# Patient Record
Sex: MALE | Race: WHITE | NOT HISPANIC OR LATINO | Employment: UNEMPLOYED | URBAN - METROPOLITAN AREA
[De-identification: names, ages, dates, MRNs, and addresses within clinical notes are randomized per-mention and may not be internally consistent; named-entity substitution may affect disease eponyms.]

---

## 2024-03-24 ENCOUNTER — HOSPITAL ENCOUNTER (EMERGENCY)
Facility: HOSPITAL | Age: 39
Discharge: LEFT BEFORE DISCHARGE | End: 2024-03-24
Payer: MEDICAID

## 2024-03-24 ENCOUNTER — APPOINTMENT (OUTPATIENT)
Dept: GENERAL RADIOLOGY | Facility: HOSPITAL | Age: 39
End: 2024-03-24
Payer: MEDICAID

## 2024-03-24 VITALS
BODY MASS INDEX: 30.48 KG/M2 | RESPIRATION RATE: 18 BRPM | TEMPERATURE: 98.1 F | HEART RATE: 87 BPM | OXYGEN SATURATION: 100 % | DIASTOLIC BLOOD PRESSURE: 80 MMHG | HEIGHT: 67 IN | WEIGHT: 194.22 LBS | SYSTOLIC BLOOD PRESSURE: 118 MMHG

## 2024-03-24 LAB
ALBUMIN SERPL-MCNC: 4 G/DL (ref 3.5–5.2)
ALBUMIN/GLOB SERPL: 1.2 G/DL
ALP SERPL-CCNC: 95 U/L (ref 39–117)
ALT SERPL W P-5'-P-CCNC: 11 U/L (ref 1–41)
ANION GAP SERPL CALCULATED.3IONS-SCNC: 9.8 MMOL/L (ref 5–15)
AST SERPL-CCNC: 17 U/L (ref 1–40)
BASOPHILS # BLD AUTO: 0.04 10*3/MM3 (ref 0–0.2)
BASOPHILS NFR BLD AUTO: 0.4 % (ref 0–1.5)
BILIRUB SERPL-MCNC: <0.2 MG/DL (ref 0–1.2)
BUN SERPL-MCNC: 17 MG/DL (ref 6–20)
BUN/CREAT SERPL: 21.3 (ref 7–25)
CALCIUM SPEC-SCNC: 9 MG/DL (ref 8.6–10.5)
CHLORIDE SERPL-SCNC: 100 MMOL/L (ref 98–107)
CO2 SERPL-SCNC: 29.2 MMOL/L (ref 22–29)
CREAT SERPL-MCNC: 0.8 MG/DL (ref 0.76–1.27)
DEPRECATED RDW RBC AUTO: 44.5 FL (ref 37–54)
EGFRCR SERPLBLD CKD-EPI 2021: 116.2 ML/MIN/1.73
EOSINOPHIL # BLD AUTO: 0.19 10*3/MM3 (ref 0–0.4)
EOSINOPHIL NFR BLD AUTO: 2 % (ref 0.3–6.2)
ERYTHROCYTE [DISTWIDTH] IN BLOOD BY AUTOMATED COUNT: 13.6 % (ref 12.3–15.4)
GLOBULIN UR ELPH-MCNC: 3.4 GM/DL
GLUCOSE SERPL-MCNC: 81 MG/DL (ref 65–99)
HCT VFR BLD AUTO: 45.2 % (ref 37.5–51)
HGB BLD-MCNC: 15.1 G/DL (ref 13–17.7)
IMM GRANULOCYTES # BLD AUTO: 0.02 10*3/MM3 (ref 0–0.05)
IMM GRANULOCYTES NFR BLD AUTO: 0.2 % (ref 0–0.5)
LYMPHOCYTES # BLD AUTO: 2.24 10*3/MM3 (ref 0.7–3.1)
LYMPHOCYTES NFR BLD AUTO: 24.1 % (ref 19.6–45.3)
MCH RBC QN AUTO: 29.7 PG (ref 26.6–33)
MCHC RBC AUTO-ENTMCNC: 33.4 G/DL (ref 31.5–35.7)
MCV RBC AUTO: 88.8 FL (ref 79–97)
MONOCYTES # BLD AUTO: 0.84 10*3/MM3 (ref 0.1–0.9)
MONOCYTES NFR BLD AUTO: 9 % (ref 5–12)
NEUTROPHILS NFR BLD AUTO: 5.96 10*3/MM3 (ref 1.7–7)
NEUTROPHILS NFR BLD AUTO: 64.3 % (ref 42.7–76)
NRBC BLD AUTO-RTO: 0 /100 WBC (ref 0–0.2)
PLATELET # BLD AUTO: 226 10*3/MM3 (ref 140–450)
PMV BLD AUTO: 9.8 FL (ref 6–12)
POTASSIUM SERPL-SCNC: 4.4 MMOL/L (ref 3.5–5.2)
PROT SERPL-MCNC: 7.4 G/DL (ref 6–8.5)
RBC # BLD AUTO: 5.09 10*6/MM3 (ref 4.14–5.8)
SODIUM SERPL-SCNC: 139 MMOL/L (ref 136–145)
WBC NRBC COR # BLD AUTO: 9.29 10*3/MM3 (ref 3.4–10.8)

## 2024-03-24 PROCEDURE — 85025 COMPLETE CBC W/AUTO DIFF WBC: CPT

## 2024-03-24 PROCEDURE — 80053 COMPREHEN METABOLIC PANEL: CPT

## 2024-03-24 PROCEDURE — 99211 OFF/OP EST MAY X REQ PHY/QHP: CPT

## 2024-03-24 PROCEDURE — 74018 RADEX ABDOMEN 1 VIEW: CPT

## 2024-03-24 NOTE — ED TRIAGE NOTES
"Pt comes to the ER tonight for no BM for 9 days. Pt states that he has taken multiple laxatives and it still hasn't had one. Pt states that he vomited tonight and states \"it looked like poop.\" Pt is a pt at the committment [place].   "

## 2024-03-24 NOTE — ED NOTES
Another RN reported patient stopped her in [facility] and stated he had a large BM, that he had be given lots of medicine at [facility] and now having results.

## 2024-04-02 ENCOUNTER — HOSPITAL ENCOUNTER (EMERGENCY)
Facility: HOSPITAL | Age: 39
Discharge: HOME | End: 2024-04-02
Attending: EMERGENCY MEDICINE
Payer: MEDICAID

## 2024-04-02 VITALS
HEIGHT: 67 IN | SYSTOLIC BLOOD PRESSURE: 124 MMHG | OXYGEN SATURATION: 92 % | DIASTOLIC BLOOD PRESSURE: 73 MMHG | HEART RATE: 84 BPM | WEIGHT: 188.49 LBS | BODY MASS INDEX: 29.58 KG/M2 | RESPIRATION RATE: 22 BRPM | TEMPERATURE: 98 F

## 2024-04-02 DIAGNOSIS — T78.40XA ALLERGIC REACTION, INITIAL ENCOUNTER: Primary | ICD-10-CM

## 2024-04-02 LAB
FLUAV SUBTYP SPEC NAA+PROBE: NOT DETECTED
FLUBV RNA ISLT QL NAA+PROBE: NOT DETECTED
HOLD SPECIMEN: NORMAL
HOLD SPECIMEN: NORMAL
RSV RNA NPH QL NAA+NON-PROBE: NOT DETECTED
S PYO AG THROAT QL: NEGATIVE
SARS-COV-2 RNA RESP QL NAA+PROBE: NOT DETECTED
WHOLE BLOOD HOLD COAG: NORMAL
WHOLE BLOOD HOLD SPECIMEN: NORMAL

## 2024-04-02 PROCEDURE — 87880 STREP A ASSAY W/OPTIC: CPT | Performed by: EMERGENCY MEDICINE

## 2024-04-02 PROCEDURE — 99283 EMERGENCY DEPT VISIT LOW MDM: CPT

## 2024-04-02 PROCEDURE — 96375 TX/PRO/DX INJ NEW DRUG ADDON: CPT

## 2024-04-02 PROCEDURE — 25010000002 DIPHENHYDRAMINE PER 50 MG: Performed by: EMERGENCY MEDICINE

## 2024-04-02 PROCEDURE — 87637 SARSCOV2&INF A&B&RSV AMP PRB: CPT | Performed by: EMERGENCY MEDICINE

## 2024-04-02 PROCEDURE — 87081 CULTURE SCREEN ONLY: CPT | Performed by: EMERGENCY MEDICINE

## 2024-04-02 PROCEDURE — 25010000002 METHYLPREDNISOLONE PER 125 MG: Performed by: EMERGENCY MEDICINE

## 2024-04-02 PROCEDURE — 96374 THER/PROPH/DIAG INJ IV PUSH: CPT

## 2024-04-02 RX ORDER — CITALOPRAM HYDROBROMIDE 10 MG/1
10 TABLET ORAL DAILY
COMMUNITY

## 2024-04-02 RX ORDER — QUETIAPINE FUMARATE 25 MG/1
25 TABLET, FILM COATED ORAL NIGHTLY
COMMUNITY

## 2024-04-02 RX ORDER — METHYLPREDNISOLONE SODIUM SUCCINATE 125 MG/2ML
125 INJECTION, POWDER, LYOPHILIZED, FOR SOLUTION INTRAMUSCULAR; INTRAVENOUS ONCE
Status: COMPLETED | OUTPATIENT
Start: 2024-04-02 | End: 2024-04-02

## 2024-04-02 RX ORDER — BUPRENORPHINE HYDROCHLORIDE AND NALOXONE HYDROCHLORIDE DIHYDRATE 8; 2 MG/1; MG/1
1 TABLET SUBLINGUAL DAILY
COMMUNITY

## 2024-04-02 RX ORDER — FAMOTIDINE 10 MG/ML
20 INJECTION, SOLUTION INTRAVENOUS ONCE
Status: COMPLETED | OUTPATIENT
Start: 2024-04-02 | End: 2024-04-02

## 2024-04-02 RX ORDER — PREDNISONE 20 MG/1
60 TABLET ORAL DAILY
Qty: 15 TABLET | Refills: 0 | Status: SHIPPED | OUTPATIENT
Start: 2024-04-02

## 2024-04-02 RX ORDER — DIPHENHYDRAMINE HYDROCHLORIDE 50 MG/ML
25 INJECTION INTRAMUSCULAR; INTRAVENOUS ONCE
Status: COMPLETED | OUTPATIENT
Start: 2024-04-02 | End: 2024-04-02

## 2024-04-02 RX ADMIN — FAMOTIDINE 20 MG: 10 INJECTION INTRAVENOUS at 16:59

## 2024-04-02 RX ADMIN — METHYLPREDNISOLONE SODIUM SUCCINATE 125 MG: 125 INJECTION INTRAMUSCULAR; INTRAVENOUS at 16:58

## 2024-04-02 RX ADMIN — DIPHENHYDRAMINE HYDROCHLORIDE 25 MG: 50 INJECTION, SOLUTION INTRAMUSCULAR; INTRAVENOUS at 16:59

## 2024-04-02 NOTE — ED NOTES
Patient is A/O x4. States his voice is raspier than normal. Oral cavity is WNL with some redness. Patient is coloring.

## 2024-04-02 NOTE — ED PROVIDER NOTES
"Time: 4:42 PM EDT  Date of encounter:  4/2/2024  Independent Historian/Clinical History and Information was obtained by:   Patient    History is limited by: N/A    Chief Complaint: Allergic reaction      History of Present Illness:  Patient is a 39 y.o. year old male who presents to the emergency department for evaluation of allergic reaction.  He denies changes of medications.  Patient states that he possibly could have had coconut.  Is currently [facility] and states that they had an Easter brunch.  Started having shortness of breath and swelling of his throat.     HPI    Patient Care Team  Primary Care Provider: Provider, No Known    Past Medical History:     No Known Allergies  Past Medical History:   Diagnosis Date    History of appendectomy      History reviewed. No pertinent surgical history.  History reviewed. No pertinent family history.    Home Medications:  Prior to Admission medications    Not on File        Social History:          Review of Systems:  Review of Systems   Constitutional:  Negative for chills and fever.   HENT:  Positive for trouble swallowing and voice change. Negative for congestion, rhinorrhea and sore throat.    Eyes:  Negative for pain and visual disturbance.   Respiratory:  Positive for shortness of breath. Negative for apnea, cough and chest tightness.    Cardiovascular:  Negative for chest pain and palpitations.   Gastrointestinal:  Negative for abdominal pain, diarrhea, nausea and vomiting.   Genitourinary:  Negative for difficulty urinating and dysuria.   Musculoskeletal:  Negative for joint swelling and myalgias.   Skin:  Negative for color change.   Neurological:  Negative for seizures and headaches.   Psychiatric/Behavioral: Negative.     All other systems reviewed and are negative.       Physical Exam:  /73   Pulse 84   Temp 98 °F (36.7 °C) (Oral)   Resp 22   Ht 170.2 cm (67\")   Wt 85.5 kg (188 lb 7.9 oz)   SpO2 92%   BMI 29.52 kg/m²     Physical " Exam  Vitals and nursing note reviewed.   Constitutional:       General: He is not in acute distress.     Appearance: Normal appearance. He is not toxic-appearing.   HENT:      Head: Normocephalic and atraumatic.      Jaw: There is normal jaw occlusion.   Eyes:      General: Lids are normal.      Extraocular Movements: Extraocular movements intact.      Conjunctiva/sclera: Conjunctivae normal.      Pupils: Pupils are equal, round, and reactive to light.   Cardiovascular:      Rate and Rhythm: Normal rate and regular rhythm.      Pulses: Normal pulses.      Heart sounds: Normal heart sounds.   Pulmonary:      Effort: Pulmonary effort is normal. No respiratory distress.      Breath sounds: Normal breath sounds. No stridor. No wheezing or rhonchi.   Abdominal:      General: Abdomen is flat. There is no distension.      Palpations: Abdomen is soft.      Tenderness: There is no abdominal tenderness. There is no guarding or rebound.   Musculoskeletal:         General: Normal range of motion.      Cervical back: Normal range of motion and neck supple.      Right lower leg: No edema.      Left lower leg: No edema.   Skin:     General: Skin is warm and dry.      Coloration: Skin is not cyanotic.   Neurological:      Mental Status: He is alert and oriented to person, place, and time. Mental status is at baseline.   Psychiatric:         Attention and Perception: Attention and perception normal.         Mood and Affect: Mood normal.                Procedures:  Procedures      Medical Decision Making:      Comorbidities that affect care:    Substance Abuse    External Notes reviewed:    None      The following orders were placed and all results were independently analyzed by me:  Orders Placed This Encounter   Procedures    COVID-19, FLU A/B, RSV PCR 1 HR TAT - Swab, Nasopharynx    Rapid Strep A Screen - Swab, Throat    Beta Strep Culture, Throat - Swab, Throat    [place] Draw    Green Top (Gel)    Lavender Top    Gold Top - SST     Light Blue Top       Medications Given in the Emergency Department:  Medications   famotidine (PEPCID) injection 20 mg (20 mg Intravenous Given 4/2/24 1659)   diphenhydrAMINE (BENADRYL) injection 25 mg (25 mg Intravenous Given 4/2/24 1659)   methylPREDNISolone sodium succinate (SOLU-Medrol) injection 125 mg (125 mg Intravenous Given 4/2/24 1658)        ED Course:    ED Course as of 04/02/24 1820 Tue Apr 02, 2024 1645 --- PROVIDER IN TRIAGE NOTE ---    The patient was evaluated by Melinda baker in triage. Orders were placed and the patient is currently awaiting disposition.    [AJ]      ED Course User Index  [AJ] Melinda Lynn PA-C       Labs:    Lab Results (last 24 hours)       Procedure Component Value Units Date/Time    COVID-19, FLU A/B, RSV PCR 1 HR TAT - Swab, Nasopharynx [[id number]]  (Normal) Collected: 04/02/24 1652    Specimen: Swab from Nasopharynx Updated: 04/02/24 1742     COVID19 Not Detected     Influenza A PCR Not Detected     Influenza B PCR Not Detected     RSV, PCR Not Detected    Narrative:      Fact sheet for providers: https://www.fda.gov/media/916333/download    Fact sheet for patients: https://www.fda.gov/media/618575/download    Test performed by PCR.    Rapid Strep A Screen - Swab, Throat [[id number]]  (Normal) Collected: 04/02/24 1652    Specimen: Swab from Throat Updated: 04/02/24 1720     Strep A Ag Negative    Beta Strep Culture, Throat - Swab, Throat [[id number]] Collected: 04/02/24 1652    Specimen: Swab from Throat Updated: 04/02/24 1720             Imaging:    No Radiology Exams Resulted Within Past 24 Hours      Differential Diagnosis and Discussion:    Allergic Reaction: Differential diagnosis includes but is not limited to drug side effects, contact dermatitis, autoimmune conditions, infections, mast cell disorders, serum sickness, anaphylactoid reactions, angioedema, panic or anxiety attacks.    All labs were reviewed and interpreted by me.    MDM  Number of  Diagnoses or Management Options  Allergic reaction, initial encounter  Diagnosis management comments: In summary this is a 39-year-old male patient presents to the emerged department for evaluation of allergic reaction.  Does state he has a known allergy to coconut and ate several pies here today and believes he has had allergic reaction to it.  He states this is how he feels over time his allergic reaction.  He complains of a hoarse and sore throat.  With voice changes.  COVID-19, influenza, RSV, strep testing all negative.  Patient received IV steroids, IV Benadryl, Pepcid the emergency department will be discharged home with steroids.  Otherwise well-appearing no acute distress, no respiratory stress specifically.  Very strict return to ER and follow-up instructions have been provided to the patient.                   Patient Care Considerations:    EPINEPHRINE: I considered giving epinephrine, however the patient has no respiratory distress, stridor and improved with treatment.      Consultants/Shared Management Plan:    None    Social Determinants of Health:    Patient is independent, reliable, and has access to care.       Disposition and Care Coordination:    Discharged: The patient is suitable and stable for discharge with no need for consideration of admission.    I have explained the patient´s condition, diagnoses and treatment plan based on the information available to me at this time. I have answered questions and addressed any concerns. The patient has a good  understanding of the patient´s diagnosis, condition, and treatment plan as can be expected at this point. The vital signs have been stable. The patient´s condition is stable and appropriate for discharge from the emergency department.      The patient will pursue further outpatient evaluation with the primary care physician or other designated or consulting physician as outlined in the discharge instructions. They are agreeable to this plan of  care and follow-up instructions have been explained in detail. The patient has received these instructions in written format and has expressed an understanding of the discharge instructions. The patient is aware that any significant change in condition or worsening of symptoms should prompt an immediate return to this or the closest emergency department or call to 911.  I have explained discharge medications and the need for follow up with the patient/caretakers. This was also printed in the discharge instructions. Patient was discharged with the following medications and follow up:      Medication List        New Prescriptions      predniSONE 20 MG tablet  Commonly known as: DELTASONE  Take 3 tablets by mouth Daily.               Where to Get Your Medications        These medications [place] [facility] [place] [address] - [phone] [address] [phone] FX  [address], [place] [address]      Phone: [phone]   predniSONE 20 MG tablet      Provider, No Known  [facility] [id number]    In 1 week         Final diagnoses:   Allergic reaction, initial encounter        ED Disposition       ED Disposition   Discharge    Condition   Stable    Comment   --               This medical record created using voice recognition software.             Casper Carson MD  04/02/24 1614     Patient Representative

## 2024-04-04 LAB — BACTERIA SPEC AEROBE CULT: NORMAL

## 2024-04-05 ENCOUNTER — TELEPHONE (OUTPATIENT)
Dept: ORTHOPEDIC SURGERY | Facility: CLINIC | Age: 39
End: 2024-04-05
Payer: MEDICAID

## 2024-04-05 NOTE — TELEPHONE ENCOUNTER
BASIA, VERIFIED NEW PT URGENCY: ROUTINE REFERRAL FROM MICHAELA CANTU TO ORTHO SURGERY FOR Right ankle pain & History of open reduction and internal fixation (ORIF) procedure. RECEIVED AND INDEXED RECORDS: REF 4.1.24, CONSENT 3.19.24,     FRANCK WITH [facility] IN [place] CALLED AND STATED THAT THE PT DOES NOT KNOW WHO HE SAW OR WHO IT WAS WITH - PT IS FROM [place] - ALL HE KNOWS IT WAS SEVERAL YEARS AGO AND CAN'T RECALL WHEN OR WHERE IT WAS DONE., REFERRING PROVIDER JUST WANTS AN EVAL - STATES THAT ITS REALLY SWOLLEN,

## 2024-06-05 ENCOUNTER — HOSPITAL ENCOUNTER (EMERGENCY)
Facility: HOSPITAL | Age: 39
Discharge: HOME | End: 2024-06-05
Attending: EMERGENCY MEDICINE | Admitting: EMERGENCY MEDICINE
Payer: MEDICAID

## 2024-06-05 ENCOUNTER — APPOINTMENT (OUTPATIENT)
Facility: HOSPITAL | Age: 39
End: 2024-06-05
Payer: MEDICAID

## 2024-06-05 VITALS
HEART RATE: 70 BPM | DIASTOLIC BLOOD PRESSURE: 62 MMHG | RESPIRATION RATE: 18 BRPM | TEMPERATURE: 98 F | SYSTOLIC BLOOD PRESSURE: 111 MMHG | HEIGHT: 67 IN | WEIGHT: 240 LBS | BODY MASS INDEX: 37.67 KG/M2 | OXYGEN SATURATION: 95 %

## 2024-06-05 DIAGNOSIS — R60.0 PEDAL EDEMA: Primary | ICD-10-CM

## 2024-06-05 LAB
ALBUMIN SERPL-MCNC: 3.8 G/DL (ref 3.5–5.2)
ALBUMIN/GLOB SERPL: 1.2 G/DL
ALP SERPL-CCNC: 76 U/L (ref 39–117)
ALT SERPL W P-5'-P-CCNC: <5 U/L (ref 1–41)
ANION GAP SERPL CALCULATED.3IONS-SCNC: 8.7 MMOL/L (ref 5–15)
AST SERPL-CCNC: 19 U/L (ref 1–40)
BASOPHILS # BLD AUTO: 0.01 10*3/MM3 (ref 0–0.2)
BASOPHILS NFR BLD AUTO: 0.2 % (ref 0–1.5)
BILIRUB SERPL-MCNC: 0.3 MG/DL (ref 0–1.2)
BUN SERPL-MCNC: 15 MG/DL (ref 6–20)
BUN/CREAT SERPL: 23.4 (ref 7–25)
CALCIUM SPEC-SCNC: 8.9 MG/DL (ref 8.6–10.5)
CHLORIDE SERPL-SCNC: 103 MMOL/L (ref 98–107)
CO2 SERPL-SCNC: 25.3 MMOL/L (ref 22–29)
CREAT SERPL-MCNC: 0.64 MG/DL (ref 0.76–1.27)
DEPRECATED RDW RBC AUTO: 41.5 FL (ref 37–54)
EGFRCR SERPLBLD CKD-EPI 2021: 124.3 ML/MIN/1.73
EOSINOPHIL # BLD AUTO: 0.25 10*3/MM3 (ref 0–0.4)
EOSINOPHIL NFR BLD AUTO: 4.2 % (ref 0.3–6.2)
ERYTHROCYTE [DISTWIDTH] IN BLOOD BY AUTOMATED COUNT: 12.8 % (ref 12.3–15.4)
GLOBULIN UR ELPH-MCNC: 3.1 GM/DL
GLUCOSE SERPL-MCNC: 115 MG/DL (ref 65–99)
HCT VFR BLD AUTO: 36.9 % (ref 37.5–51)
HGB BLD-MCNC: 12.7 G/DL (ref 13–17.7)
IMM GRANULOCYTES # BLD AUTO: 0.02 10*3/MM3 (ref 0–0.05)
IMM GRANULOCYTES NFR BLD AUTO: 0.3 % (ref 0–0.5)
LYMPHOCYTES # BLD AUTO: 1.93 10*3/MM3 (ref 0.7–3.1)
LYMPHOCYTES NFR BLD AUTO: 32.6 % (ref 19.6–45.3)
MCH RBC QN AUTO: 29.9 PG (ref 26.6–33)
MCHC RBC AUTO-ENTMCNC: 34.4 G/DL (ref 31.5–35.7)
MCV RBC AUTO: 86.8 FL (ref 79–97)
MONOCYTES # BLD AUTO: 0.65 10*3/MM3 (ref 0.1–0.9)
MONOCYTES NFR BLD AUTO: 11 % (ref 5–12)
NEUTROPHILS NFR BLD AUTO: 3.06 10*3/MM3 (ref 1.7–7)
NEUTROPHILS NFR BLD AUTO: 51.7 % (ref 42.7–76)
NT-PROBNP SERPL-MCNC: 43.6 PG/ML (ref 0–450)
PLATELET # BLD AUTO: 272 10*3/MM3 (ref 140–450)
PMV BLD AUTO: 9.3 FL (ref 6–12)
POTASSIUM SERPL-SCNC: 3.8 MMOL/L (ref 3.5–5.2)
PROT SERPL-MCNC: 6.9 G/DL (ref 6–8.5)
RBC # BLD AUTO: 4.25 10*6/MM3 (ref 4.14–5.8)
SODIUM SERPL-SCNC: 137 MMOL/L (ref 136–145)
TROPONIN T SERPL HS-MCNC: <6 NG/L
WBC NRBC COR # BLD AUTO: 5.92 10*3/MM3 (ref 3.4–10.8)

## 2024-06-05 PROCEDURE — 80053 COMPREHEN METABOLIC PANEL: CPT | Performed by: EMERGENCY MEDICINE

## 2024-06-05 PROCEDURE — 93005 ELECTROCARDIOGRAM TRACING: CPT | Performed by: EMERGENCY MEDICINE

## 2024-06-05 PROCEDURE — 99284 EMERGENCY DEPT VISIT MOD MDM: CPT

## 2024-06-05 PROCEDURE — 84484 ASSAY OF TROPONIN QUANT: CPT | Performed by: EMERGENCY MEDICINE

## 2024-06-05 PROCEDURE — 83880 ASSAY OF NATRIURETIC PEPTIDE: CPT | Performed by: EMERGENCY MEDICINE

## 2024-06-05 PROCEDURE — 85025 COMPLETE CBC W/AUTO DIFF WBC: CPT | Performed by: EMERGENCY MEDICINE

## 2024-06-05 PROCEDURE — 71045 X-RAY EXAM CHEST 1 VIEW: CPT

## 2024-06-05 PROCEDURE — 96374 THER/PROPH/DIAG INJ IV PUSH: CPT

## 2024-06-05 PROCEDURE — 25010000002 FUROSEMIDE PER 20 MG: Performed by: EMERGENCY MEDICINE

## 2024-06-05 RX ORDER — FUROSEMIDE 10 MG/ML
20 INJECTION INTRAMUSCULAR; INTRAVENOUS ONCE
Status: COMPLETED | OUTPATIENT
Start: 2024-06-05 | End: 2024-06-05

## 2024-06-05 RX ORDER — SODIUM CHLORIDE 0.9 % (FLUSH) 0.9 %
10 SYRINGE (ML) INJECTION AS NEEDED
Status: DISCONTINUED | OUTPATIENT
Start: 2024-06-05 | End: 2024-06-05 | Stop reason: HOSPADM

## 2024-06-05 RX ORDER — FUROSEMIDE 20 MG/1
20 TABLET ORAL DAILY
Qty: 3 TABLET | Refills: 0 | Status: SHIPPED | OUTPATIENT
Start: 2024-06-05 | End: 2024-06-05

## 2024-06-05 RX ORDER — FUROSEMIDE 20 MG/1
20 TABLET ORAL DAILY
Qty: 3 TABLET | Refills: 0 | Status: SHIPPED | OUTPATIENT
Start: 2024-06-05 | End: 2024-06-08

## 2024-06-05 RX ADMIN — FUROSEMIDE 20 MG: 10 INJECTION, SOLUTION INTRAMUSCULAR; INTRAVENOUS at 18:49

## 2024-06-05 NOTE — FSED PROVIDER NOTE
Subjective   History of Present Illness  Patient presents to the emergency department for bilateral lower extremity edema.  This has been going on for the past several months.  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  Says he has had heart attacks in the past but has no known history of congestive heart failure.  He denies shortness of breath.  Says he occasionally has chest pain but does not actually have any at this time.  No recent illnesses nausea or vomiting.  Says his feet and legs do burn.    History provided by:  Patient   used: No        Review of Systems   Cardiovascular:  Positive for leg swelling.   All other systems reviewed and are negative.      Past Medical History:   Diagnosis Date    History of appendectomy        Allergies   Allergen Reactions    Coconut Anaphylaxis       No past surgical history on file.    No family history on file.    Social History     Socioeconomic History    Marital status:            Objective   Physical Exam  Constitutional:       Appearance: Normal appearance.   HENT:      Head: Normocephalic and atraumatic.      Nose: Nose normal.      Mouth/Throat:      Mouth: Mucous membranes are moist.      Pharynx: Oropharynx is clear.   Eyes:      Extraocular Movements: Extraocular movements intact.      Pupils: Pupils are equal, round, and reactive to light.   Cardiovascular:      Rate and Rhythm: Normal rate and regular rhythm.      Pulses: Normal pulses.           Dorsalis pedis pulses are 2+ on the right side and 2+ on the left side.      Heart sounds: Normal heart sounds. No murmur heard.  Musculoskeletal:      Cervical back: Normal range of motion and neck supple.      Right lower le+ Pitting Edema present.      Left lower le+ Pitting Edema present.   Skin:     General: Skin is warm and dry.      Capillary Refill: Capillary refill takes less than 2 seconds.      Coloration: Skin is not jaundiced.   Neurological:      General: No focal  deficit present.      Mental Status: He is alert and oriented to person, place, and time.      Cranial Nerves: No cranial nerve deficit.   Psychiatric:         Mood and Affect: Mood normal.         Behavior: Behavior normal.         ECG 12 Lead      Date/Time: 6/5/2024 6:32 PM    Performed by: Lul Shaw MD  Authorized by: Lul Shaw MD  Interpreted by ED physician  Comparison: not compared with previous ECG   Rhythm: sinus rhythm  Rate: normal  BPM: 65  QRS axis: normal  Conduction: conduction normal  ST Segments: ST segments normal  Clinical impression: normal ECG               ED Course                                           Medical Decision Making  Hemodynamically stable and afebrile.  Patient has 2+ pedal edema in his lower extremities but is having no respiratory compromise.  Chest x-ray shows no edema and his EKG is normal.  Troponins and BNP are likewise negative.  Patient will need follow-up.  Will give a short course of Lasix to try to help alleviate his symptoms.  Patient voices understanding and agreement with this plan.  Discharge    Problems Addressed:  Pedal edema: complicated acute illness or injury    Amount and/or Complexity of Data Reviewed  Labs: ordered. Decision-making details documented in ED Course.  Radiology: ordered. Decision-making details documented in ED Course.  ECG/medicine tests: ordered. Decision-making details documented in ED Course.    Risk  Prescription drug management.        Final diagnoses:   Pedal edema       ED Disposition  ED Disposition       ED Disposition   Discharge    Condition   Stable    Comment   --               [facility] EMERGENCY DEPARTMENT [place]  [address]  [facility] [id number] [phone]  Go to   As needed    [facility]IENT [facility] - [facility] [id number]  [phone]  Schedule an appointment as soon as possible for a visit   As needed         Medication List        New Prescriptions       furosemide 20 MG tablet  Commonly known as: LASIX  Take 1 tablet by mouth Daily for 3 days.               Where to Get Your Medications        You can get these medications from any pharmacy    Bring a paper prescription for each of these medications  furosemide 20 MG tablet

## 2024-06-07 LAB
QT INTERVAL: 434 MS
QTC INTERVAL: 451 MS

## 2024-08-14 ENCOUNTER — APPOINTMENT (OUTPATIENT)
Dept: ULTRASOUND IMAGING | Facility: HOSPITAL | Age: 39
End: 2024-08-14
Payer: MEDICAID

## 2024-08-14 ENCOUNTER — APPOINTMENT (OUTPATIENT)
Dept: GENERAL RADIOLOGY | Facility: HOSPITAL | Age: 39
End: 2024-08-14
Payer: MEDICAID

## 2024-08-14 ENCOUNTER — APPOINTMENT (OUTPATIENT)
Dept: CT IMAGING | Facility: HOSPITAL | Age: 39
End: 2024-08-14
Payer: MEDICAID

## 2024-08-14 ENCOUNTER — HOSPITAL ENCOUNTER (EMERGENCY)
Facility: HOSPITAL | Age: 39
Discharge: HOME | End: 2024-08-14
Attending: STUDENT IN AN ORGANIZED HEALTH CARE EDUCATION/TRAINING PROGRAM
Payer: MEDICAID

## 2024-08-14 VITALS
OXYGEN SATURATION: 98 % | TEMPERATURE: 97.9 F | HEART RATE: 80 BPM | WEIGHT: 245 LBS | SYSTOLIC BLOOD PRESSURE: 107 MMHG | HEIGHT: 67 IN | RESPIRATION RATE: 18 BRPM | BODY MASS INDEX: 38.45 KG/M2 | DIASTOLIC BLOOD PRESSURE: 63 MMHG

## 2024-08-14 DIAGNOSIS — R60.0 LOWER EXTREMITY EDEMA: Primary | ICD-10-CM

## 2024-08-14 DIAGNOSIS — F19.20 POLYSUBSTANCE (EXCLUDING OPIOIDS) DEPENDENCE: ICD-10-CM

## 2024-08-14 LAB
ALBUMIN SERPL-MCNC: 3.6 G/DL (ref 3.5–5.2)
ALBUMIN/GLOB SERPL: 1.2 G/DL
ALP SERPL-CCNC: 94 U/L (ref 39–117)
ALT SERPL W P-5'-P-CCNC: 17 U/L (ref 1–41)
AMPHET+METHAMPHET UR QL: NEGATIVE
AMPHETAMINES UR QL: NEGATIVE
ANION GAP SERPL CALCULATED.3IONS-SCNC: 11 MMOL/L (ref 5–15)
AST SERPL-CCNC: 21 U/L (ref 1–40)
BARBITURATES UR QL SCN: NEGATIVE
BASOPHILS # BLD AUTO: 0.03 10*3/MM3 (ref 0–0.2)
BASOPHILS NFR BLD AUTO: 0.5 % (ref 0–1.5)
BENZODIAZ UR QL SCN: NEGATIVE
BILIRUB SERPL-MCNC: <0.2 MG/DL (ref 0–1.2)
BUN SERPL-MCNC: 10 MG/DL (ref 6–20)
BUN/CREAT SERPL: 15.6 (ref 7–25)
BUPRENORPHINE SERPL-MCNC: POSITIVE NG/ML
CALCIUM SPEC-SCNC: 9.1 MG/DL (ref 8.6–10.5)
CANNABINOIDS SERPL QL: NEGATIVE
CHLORIDE SERPL-SCNC: 104 MMOL/L (ref 98–107)
CO2 SERPL-SCNC: 25 MMOL/L (ref 22–29)
COCAINE UR QL: NEGATIVE
CREAT SERPL-MCNC: 0.64 MG/DL (ref 0.76–1.27)
D DIMER PPP FEU-MCNC: 1 MCGFEU/ML (ref 0–0.5)
DEPRECATED RDW RBC AUTO: 41.6 FL (ref 37–54)
EGFRCR SERPLBLD CKD-EPI 2021: 124.3 ML/MIN/1.73
EOSINOPHIL # BLD AUTO: 0.18 10*3/MM3 (ref 0–0.4)
EOSINOPHIL NFR BLD AUTO: 2.7 % (ref 0.3–6.2)
ERYTHROCYTE [DISTWIDTH] IN BLOOD BY AUTOMATED COUNT: 12.7 % (ref 12.3–15.4)
ETHANOL BLD-MCNC: <10 MG/DL (ref 0–10)
ETHANOL UR QL: <0.01 %
FENTANYL UR-MCNC: NEGATIVE NG/ML
GEN 5 2HR TROPONIN T REFLEX: <6 NG/L
GLOBULIN UR ELPH-MCNC: 2.9 GM/DL
GLUCOSE SERPL-MCNC: 88 MG/DL (ref 65–99)
HCT VFR BLD AUTO: 36.9 % (ref 37.5–51)
HGB BLD-MCNC: 12.3 G/DL (ref 13–17.7)
HOLD SPECIMEN: NORMAL
HOLD SPECIMEN: NORMAL
IMM GRANULOCYTES # BLD AUTO: 0.05 10*3/MM3 (ref 0–0.05)
IMM GRANULOCYTES NFR BLD AUTO: 0.8 % (ref 0–0.5)
LYMPHOCYTES # BLD AUTO: 1.75 10*3/MM3 (ref 0.7–3.1)
LYMPHOCYTES NFR BLD AUTO: 26.7 % (ref 19.6–45.3)
MCH RBC QN AUTO: 29.9 PG (ref 26.6–33)
MCHC RBC AUTO-ENTMCNC: 33.3 G/DL (ref 31.5–35.7)
MCV RBC AUTO: 89.6 FL (ref 79–97)
METHADONE UR QL SCN: NEGATIVE
MONOCYTES # BLD AUTO: 0.65 10*3/MM3 (ref 0.1–0.9)
MONOCYTES NFR BLD AUTO: 9.9 % (ref 5–12)
NEUTROPHILS NFR BLD AUTO: 3.9 10*3/MM3 (ref 1.7–7)
NEUTROPHILS NFR BLD AUTO: 59.4 % (ref 42.7–76)
NRBC BLD AUTO-RTO: 0 /100 WBC (ref 0–0.2)
NT-PROBNP SERPL-MCNC: 215.3 PG/ML (ref 0–450)
OPIATES UR QL: NEGATIVE
OXYCODONE UR QL SCN: NEGATIVE
PCP UR QL SCN: NEGATIVE
PLATELET # BLD AUTO: 251 10*3/MM3 (ref 140–450)
PMV BLD AUTO: 9.7 FL (ref 6–12)
POTASSIUM SERPL-SCNC: 4.2 MMOL/L (ref 3.5–5.2)
PROT SERPL-MCNC: 6.5 G/DL (ref 6–8.5)
QT INTERVAL: 400 MS
QTC INTERVAL: 434 MS
RBC # BLD AUTO: 4.12 10*6/MM3 (ref 4.14–5.8)
SODIUM SERPL-SCNC: 140 MMOL/L (ref 136–145)
TRICYCLICS UR QL SCN: NEGATIVE
TROPONIN T DELTA: NORMAL
TROPONIN T SERPL HS-MCNC: <6 NG/L
TSH SERPL DL<=0.05 MIU/L-ACNC: 2.39 UIU/ML (ref 0.27–4.2)
WBC NRBC COR # BLD AUTO: 6.56 10*3/MM3 (ref 3.4–10.8)
WHOLE BLOOD HOLD COAG: NORMAL
WHOLE BLOOD HOLD SPECIMEN: NORMAL

## 2024-08-14 PROCEDURE — 25510000001 IOPAMIDOL PER 1 ML: Performed by: STUDENT IN AN ORGANIZED HEALTH CARE EDUCATION/TRAINING PROGRAM

## 2024-08-14 PROCEDURE — 85379 FIBRIN DEGRADATION QUANT: CPT | Performed by: PHYSICIAN ASSISTANT

## 2024-08-14 PROCEDURE — 84484 ASSAY OF TROPONIN QUANT: CPT | Performed by: PHYSICIAN ASSISTANT

## 2024-08-14 PROCEDURE — 83880 ASSAY OF NATRIURETIC PEPTIDE: CPT | Performed by: PHYSICIAN ASSISTANT

## 2024-08-14 PROCEDURE — 80307 DRUG TEST PRSMV CHEM ANLYZR: CPT | Performed by: PHYSICIAN ASSISTANT

## 2024-08-14 PROCEDURE — 71045 X-RAY EXAM CHEST 1 VIEW: CPT

## 2024-08-14 PROCEDURE — 82077 ASSAY SPEC XCP UR&BREATH IA: CPT | Performed by: PHYSICIAN ASSISTANT

## 2024-08-14 PROCEDURE — 93010 ELECTROCARDIOGRAM REPORT: CPT | Performed by: INTERNAL MEDICINE

## 2024-08-14 PROCEDURE — 36415 COLL VENOUS BLD VENIPUNCTURE: CPT

## 2024-08-14 PROCEDURE — 85025 COMPLETE CBC W/AUTO DIFF WBC: CPT | Performed by: PHYSICIAN ASSISTANT

## 2024-08-14 PROCEDURE — 99285 EMERGENCY DEPT VISIT HI MDM: CPT

## 2024-08-14 PROCEDURE — 93970 EXTREMITY STUDY: CPT | Performed by: RADIOLOGY

## 2024-08-14 PROCEDURE — 93005 ELECTROCARDIOGRAM TRACING: CPT | Performed by: PHYSICIAN ASSISTANT

## 2024-08-14 PROCEDURE — 71275 CT ANGIOGRAPHY CHEST: CPT

## 2024-08-14 PROCEDURE — 71275 CT ANGIOGRAPHY CHEST: CPT | Performed by: RADIOLOGY

## 2024-08-14 PROCEDURE — 71045 X-RAY EXAM CHEST 1 VIEW: CPT | Performed by: RADIOLOGY

## 2024-08-14 PROCEDURE — 93970 EXTREMITY STUDY: CPT

## 2024-08-14 PROCEDURE — 84443 ASSAY THYROID STIM HORMONE: CPT | Performed by: PHYSICIAN ASSISTANT

## 2024-08-14 PROCEDURE — 80053 COMPREHEN METABOLIC PANEL: CPT | Performed by: PHYSICIAN ASSISTANT

## 2024-08-14 RX ORDER — FUROSEMIDE 40 MG/1
40 TABLET ORAL 2 TIMES DAILY
Qty: 14 TABLET | Refills: 0 | Status: SHIPPED | OUTPATIENT
Start: 2024-08-14 | End: 2024-08-21

## 2024-08-14 RX ORDER — FUROSEMIDE 40 MG/1
40 TABLET ORAL 2 TIMES DAILY
Qty: 14 TABLET | Refills: 0 | Status: SHIPPED | OUTPATIENT
Start: 2024-08-14 | End: 2024-08-14 | Stop reason: SDUPTHER

## 2024-08-14 RX ORDER — ASPIRIN 81 MG/1
324 TABLET, CHEWABLE ORAL ONCE
Status: COMPLETED | OUTPATIENT
Start: 2024-08-14 | End: 2024-08-14

## 2024-08-14 RX ORDER — SODIUM CHLORIDE 0.9 % (FLUSH) 0.9 %
10 SYRINGE (ML) INJECTION AS NEEDED
Status: DISCONTINUED | OUTPATIENT
Start: 2024-08-14 | End: 2024-08-14 | Stop reason: HOSPADM

## 2024-08-14 RX ADMIN — ASPIRIN 81 MG 324 MG: 81 TABLET ORAL at 10:19

## 2024-08-14 RX ADMIN — IOPAMIDOL 70 ML: 755 INJECTION, SOLUTION INTRAVENOUS at 11:34

## 2024-08-14 NOTE — ED PROVIDER NOTES
Subjective   History of Present Illness  Mr. Agustin Frankel is a 39 year old male who reports PMH significant for COPD. He reports he has been having generalized edema for around 6 months. He also reports chest tightness for the last 4 days. He states he has been having health issues for some time now but has not been able to find a cause. He reports he currently is living in an addiction recovery facility and started taking suboxone again yesterday. He reports significant weight gain since moving into this facility as well.       Review of Systems   Constitutional:  Positive for activity change and fatigue. Negative for fever.   HENT: Negative.     Respiratory: Negative.     Cardiovascular:  Positive for chest pain and leg swelling.   Gastrointestinal:  Positive for abdominal pain.   Endocrine: Negative.    Genitourinary: Negative.  Negative for dysuria.   Musculoskeletal:  Positive for joint swelling.   Skin: Negative.    Neurological: Negative.    Psychiatric/Behavioral: Negative.     All other systems reviewed and are negative.      Past Medical History:   Diagnosis Date    History of appendectomy        Allergies   Allergen Reactions    Coconut Anaphylaxis       No past surgical history on file.    No family history on file.    Social History     Socioeconomic History    Marital status:            Objective   Physical Exam  Vitals and nursing note reviewed.   Constitutional:       General: He is not in acute distress.     Appearance: He is well-developed. He is not diaphoretic.   HENT:      Head: Normocephalic and atraumatic.      Right Ear: External ear normal.      Left Ear: External ear normal.      Nose: Nose normal.   Eyes:      Conjunctiva/sclera: Conjunctivae normal.      Pupils: Pupils are equal, round, and reactive to light.   Neck:      Vascular: No JVD.      Trachea: No tracheal deviation.   Cardiovascular:      Rate and Rhythm: Normal rate and regular rhythm.      Heart sounds: Normal heart  sounds. No murmur heard.  Pulmonary:      Effort: Pulmonary effort is normal. No respiratory distress.      Breath sounds: Normal breath sounds. No wheezing.   Abdominal:      General: Bowel sounds are normal.      Palpations: Abdomen is soft.      Tenderness: There is abdominal tenderness.   Musculoskeletal:         General: Swelling present. No deformity. Normal range of motion.      Cervical back: Normal range of motion and neck supple.   Skin:     General: Skin is warm and dry.      Coloration: Skin is not pale.      Findings: No erythema or rash.   Neurological:      Mental Status: He is alert and oriented to person, place, and time.      Cranial Nerves: No cranial nerve deficit.   Psychiatric:         Behavior: Behavior normal.         Thought Content: Thought content normal.         Procedures  Results for orders placed or performed during the hospital encounter of 08/14/24   Comprehensive Metabolic Panel    Specimen: Blood   Result Value Ref Range    Glucose 88 65 - 99 mg/dL    BUN 10 6 - 20 mg/dL    Creatinine 0.64 (L) 0.76 - 1.27 mg/dL    Sodium 140 136 - 145 mmol/L    Potassium 4.2 3.5 - 5.2 mmol/L    Chloride 104 98 - 107 mmol/L    CO2 25.0 22.0 - 29.0 mmol/L    Calcium 9.1 8.6 - 10.5 mg/dL    Total Protein 6.5 6.0 - 8.5 g/dL    Albumin 3.6 3.5 - 5.2 g/dL    ALT (SGPT) 17 1 - 41 U/L    AST (SGOT) 21 1 - 40 U/L    Alkaline Phosphatase 94 39 - 117 U/L    Total Bilirubin <0.2 0.0 - 1.2 mg/dL    Globulin 2.9 gm/dL    A/G Ratio 1.2 g/dL    BUN/Creatinine Ratio 15.6 7.0 - 25.0    Anion Gap 11.0 5.0 - 15.0 mmol/L    eGFR 124.3 >60.0 mL/min/1.73   High Sensitivity Troponin T    Specimen: Blood   Result Value Ref Range    HS Troponin T <6 <22 ng/L   BNP    Specimen: Blood   Result Value Ref Range    proBNP 215.3 0.0 - 450.0 pg/mL   D-dimer, Quantitative    Specimen: Blood   Result Value Ref Range    D-Dimer, Quantitative 1.00 (H) 0.00 - 0.50 MCGFEU/mL   TSH    Specimen: Blood   Result Value Ref Range    TSH 2.390  0.270 - 4.200 uIU/mL   CBC Auto Differential    Specimen: Blood   Result Value Ref Range    WBC 6.56 3.40 - 10.80 10*3/mm3    RBC 4.12 (L) 4.14 - 5.80 10*6/mm3    Hemoglobin 12.3 (L) 13.0 - 17.7 g/dL    Hematocrit 36.9 (L) 37.5 - 51.0 %    MCV 89.6 79.0 - 97.0 fL    MCH 29.9 26.6 - 33.0 pg    MCHC 33.3 31.5 - 35.7 g/dL    RDW 12.7 12.3 - 15.4 %    RDW-SD 41.6 37.0 - 54.0 fl    MPV 9.7 6.0 - 12.0 fL    Platelets 251 140 - 450 10*3/mm3    Neutrophil % 59.4 42.7 - 76.0 %    Lymphocyte % 26.7 19.6 - 45.3 %    Monocyte % 9.9 5.0 - 12.0 %    Eosinophil % 2.7 0.3 - 6.2 %    Basophil % 0.5 0.0 - 1.5 %    Immature Grans % 0.8 (H) 0.0 - 0.5 %    Neutrophils, Absolute 3.90 1.70 - 7.00 10*3/mm3    Lymphocytes, Absolute 1.75 0.70 - 3.10 10*3/mm3    Monocytes, Absolute 0.65 0.10 - 0.90 10*3/mm3    Eosinophils, Absolute 0.18 0.00 - 0.40 10*3/mm3    Basophils, Absolute 0.03 0.00 - 0.20 10*3/mm3    Immature Grans, Absolute 0.05 0.00 - 0.05 10*3/mm3    nRBC 0.0 0.0 - 0.2 /100 WBC   Ethanol    Specimen: Blood   Result Value Ref Range    Ethanol <10 0 - 10 mg/dL    Ethanol % <0.010 %   Urine Drug Screen - Urine, Clean Catch    Specimen: Urine, Clean Catch   Result Value Ref Range    THC, Screen, Urine Negative Negative    Phencyclidine (PCP), Urine Negative Negative    Cocaine Screen, Urine Negative Negative    Methamphetamine, Ur Negative Negative    Opiate Screen Negative Negative    Amphetamine Screen, Urine Negative Negative    Benzodiazepine Screen, Urine Negative Negative    Tricyclic Antidepressants Screen Negative Negative    Methadone Screen, Urine Negative Negative    Barbiturates Screen, Urine Negative Negative    Oxycodone Screen, Urine Negative Negative    Buprenorphine, Screen, Urine Positive (A) Negative   Fentanyl, Urine - Urine, Clean Catch    Specimen: Urine, Clean Catch   Result Value Ref Range    Fentanyl, Urine Negative Negative   ECG 12 Lead Chest Pain   Result Value Ref Range    QT Interval 400 ms    QTC Interval  434 ms   Green Top (Gel)   Result Value Ref Range    Extra Tube Hold for add-ons.    Lavender Top   Result Value Ref Range    Extra Tube hold for add-on    Gold Top - SST   Result Value Ref Range    Extra Tube Hold for add-ons.    Light Blue Top   Result Value Ref Range    Extra Tube Hold for add-ons.         US Venous Doppler Lower Extremity Bilateral (duplex)    Result Date: 8/14/2024    Normal bilateral lower extremity duplex venous ultrasound.   This report was finalized on 8/14/2024 12:26 PM by Dr. Giovanni Wilson MD.      CT Angiogram Chest Pulmonary Embolism    Result Date: 8/14/2024    No pulmonary embolism.   This report was finalized on 8/14/2024 12:23 PM by Dr. Giovanni Wilson MD.      XR Chest 1 View    Result Date: 8/14/2024    Unremarkable exam. No acute cardiopulmonary findings identified.   This report was finalized on 8/14/2024 10:26 AM by Dr. Giovanni Wilson MD.      XR chest 2 views    Result Date: 8/13/2024  Mild pulmonary vascular congestion. LEFT ANKLE SERIES. HISTORY: Acute left ankle pain, swelling. COMPARISON: May 14, 2024. FINDINGS: Three views show postoperative changes of ORIF. Hardware is stable and appears intact. There has been interval worsening of diffuse soft tissue edema. There is widening of the medial ankle mortise, unchanged. IMPRESSION: Worsening soft tissue edema. Images reviewed, interpreted, and dictated by Dr. Justina Mcdonald. Transcribed by Philly Hull PA-C.    XR ankle 3 views left    Result Date: 8/13/2024  Mild pulmonary vascular congestion. LEFT ANKLE SERIES. HISTORY: Acute left ankle pain, swelling. COMPARISON: May 14, 2024. FINDINGS: Three views show postoperative changes of ORIF. Hardware is stable and appears intact. There has been interval worsening of diffuse soft tissue edema. There is widening of the medial ankle mortise, unchanged. IMPRESSION: Worsening soft tissue edema. Images reviewed, interpreted, and dictated by Dr. Justina Mcdonald. Transcribed by Philly  SP Hull.           ED Course  ED Course as of 08/14/24 1255   Wed Aug 14, 2024   1029 XR Chest 1 View [NU]   1035 EKG in sinus rhythm.  71 beats per arm.  No acute ST elevation.  QTc 434.  Electronically signed by Ector Zelaya DO, 08/14/24, 10:35 AM EDT.   [SF]   1036 D-Dimer, Quant(!): 1.00 [NU]   1037 Comprehensive Metabolic Panel [NU]   1045 Comprehensive Metabolic Panel [NU]   1046 Comprehensive Metabolic Panel [NU]   1134 D-Dimer, Quant(!): 1.00 [NU]      ED Course User Index  [NU] Kaci Loomis PA-C  [SF] Ector Zelaya DO                                             Medical Decision Making  Mr. Agustin Frankel is a 39 year old male who reports PMH significant for COPD. He reports he has been having generalized edema for around 6 months. He also reports chest tightness for the last 4 days. He states he has been having health issues for some time now but has not been able to find a cause. He reports he currently is living in an addiction recovery facility and started taking suboxone again yesterday. He reports significant weight gain since moving into this facility as well.       Amount and/or Complexity of Data Reviewed  Labs: ordered.  Radiology: ordered.  ECG/medicine tests: ordered.    Risk  OTC drugs.  Prescription drug management.  Risk Details: Discussed findings and risks. Chest pain appears to be more of a burning. Workup negative.         Final diagnoses:   Lower extremity edema   Polysubstance (excluding opioids) dependence       ED Disposition  ED Disposition       ED Disposition   Discharge    Condition   Stable    Comment   --               Sydney Gleason MD  [address] [place], [place] [id number]  [phone]    Schedule an appointment as soon as possible for a visit in 1 day           Medication List        Changed      furosemide 40 MG tablet  Commonly known as: LASIX  Take 1 tablet by mouth 2 (Two) Times a Day for 7 days.  What changed:   medication strength  how much to  take  when to take this               Where to Get Your Medications        You can get these medications from any pharmacy    Bring a paper prescription for each of these medications  furosemide 40 MG tablet            Kaci Loomis PA-C  08/14/24 1257

## 2024-09-12 ENCOUNTER — OFFICE VISIT (OUTPATIENT)
Dept: CARDIOLOGY | Facility: HOSPITAL | Age: 39
End: 2024-09-12
Payer: MEDICAID

## 2024-09-12 VITALS
TEMPERATURE: 97 F | HEART RATE: 52 BPM | RESPIRATION RATE: 16 BRPM | SYSTOLIC BLOOD PRESSURE: 100 MMHG | WEIGHT: 256 LBS | HEIGHT: 67 IN | BODY MASS INDEX: 40.18 KG/M2 | OXYGEN SATURATION: 94 % | DIASTOLIC BLOOD PRESSURE: 55 MMHG

## 2024-09-12 DIAGNOSIS — R60.0 EDEMA LEG: ICD-10-CM

## 2024-09-12 DIAGNOSIS — R07.9 CHEST PAIN, UNSPECIFIED TYPE: ICD-10-CM

## 2024-09-12 DIAGNOSIS — R55 SYNCOPE AND COLLAPSE: ICD-10-CM

## 2024-09-12 DIAGNOSIS — F19.10 POLYSUBSTANCE ABUSE: ICD-10-CM

## 2024-09-12 DIAGNOSIS — R06.02 SHORTNESS OF BREATH: ICD-10-CM

## 2024-09-12 DIAGNOSIS — R00.2 PALPITATIONS: ICD-10-CM

## 2024-09-12 RX ORDER — ATENOLOL 25 MG/1
25 TABLET ORAL DAILY
COMMUNITY
Start: 2024-09-05

## 2024-09-12 RX ORDER — ASPIRIN 81 MG/1
81 TABLET, COATED ORAL DAILY
COMMUNITY
Start: 2024-09-05

## 2024-09-12 RX ORDER — FUROSEMIDE 40 MG
40 TABLET ORAL 2 TIMES DAILY
Qty: 60 TABLET | Refills: 2 | Status: SHIPPED | OUTPATIENT
Start: 2024-09-12

## 2024-09-12 NOTE — PATIENT INSTRUCTIONS
Increase lasix 40 mg twice a day    Keep taking potassium as scheduled    Repeat labs in 2 weeks    You will be scheduled stress test and echocardiogram    You will be called to schedule your follow up with [facility]

## 2024-09-12 NOTE — PROGRESS NOTES
"[facility]    Chief Complaint  Chest Pain and Edema    Subjective    History of Present Illness {CC  Problem List  Visit  Diagnosis   Encounters  Notes  Medications  Labs  Result Review Imaging  Media :23}     Agustin Frankel presents to [facility] for   History of Present Illness     39-year-old male with history of heart failure, reported CAD (reported previous MIs.  NO HX OF STENTS), COPD.  History of substance (including IV) abuse currently taking Suboxone and living in recovery facility.    Never seen a cardiologist.  Patient has had worsening chest pain, edema over the last 6 months. Reports being clean for 6 months from IV use.   He had presented to urgent care on 8/28/2024 with CP and was referred to [facility] I do not see that patient went to hospital.  No medical records available to review.    I do not see any medical records from that date at [facility] in epic for review.    Patient did present to [facility] ED in [place] on 8/14/2024.  He was started on Lasix during that visit.    Continued Chest tightnesss.  2-3 times per week.  15-20 min duration.  Sore to touch.  Intermittent palpitations.  Intermittent dizziness.  Worsening dyspnea.     Hx of syncope  (last epidose 1 week).  Just woke up, standing on porch.  No morning meds. Had breakfast.     Reports new meds in the last 2 weeks.     Pt does not have PCP.  Seeing an NP with the recovery center.     Pt reported decreased activity tolerance due to dyspnea, leg swelling and CP.      Objective     Vital Signs:   Vitals:    09/12/24 1408 09/12/24 1412   BP: 114/70 100/55   BP Location: Left arm Left arm   Patient Position: Standing Sitting   Cuff Size: Adult Adult   Pulse: 54 52   Resp:  16   Temp:  97 °F (36.1 °C)   TempSrc:  Temporal   SpO2: 95% 94%   Weight:  116 kg (256 lb)   Height:  170.2 cm (67\")     Body mass index is 40.1 " kg/m².  Physical Exam  Vitals reviewed.   Constitutional:       General: He is not in acute distress.  Neck:      Vascular: No carotid bruit.   Cardiovascular:      Rate and Rhythm: Regular rhythm. Bradycardia present.      Heart sounds: Murmur heard.   Pulmonary:      Effort: Pulmonary effort is normal.      Breath sounds: Normal breath sounds.   Musculoskeletal:      Right lower leg: Edema present.      Left lower leg: Edema present.   Skin:     Coloration: Skin is not pale.   Neurological:      Mental Status: He is alert.   Psychiatric:         Mood and Affect: Mood normal.         Behavior: Behavior normal. Behavior is cooperative.              Result Review  Data Reviewed:{ Labs  Result Review  Imaging  Med Tab  Media :23}   EKG 8/14/2024: Normal sinus rhythm 71 bpm    Admission on 08/14/2024, Discharged on 08/14/2024   Component Date Value Ref Range Status    QT Interval 08/14/2024 400  ms Final    QTC Interval 08/14/2024 434  ms Final    Glucose 08/14/2024 88  65 - 99 mg/dL Final    BUN 08/14/2024 10  6 - 20 mg/dL Final    Creatinine 08/14/2024 0.64 (L)  0.76 - 1.27 mg/dL Final    Sodium 08/14/2024 140  136 - 145 mmol/L Final    Potassium 08/14/2024 4.2  3.5 - 5.2 mmol/L Final    Chloride 08/14/2024 104  98 - 107 mmol/L Final    CO2 08/14/2024 25.0  22.0 - 29.0 mmol/L Final    Calcium 08/14/2024 9.1  8.6 - 10.5 mg/dL Final    Total Protein 08/14/2024 6.5  6.0 - 8.5 g/dL Final    Albumin 08/14/2024 3.6  3.5 - 5.2 g/dL Final    ALT (SGPT) 08/14/2024 17  1 - 41 U/L Final    AST (SGOT) 08/14/2024 21  1 - 40 U/L Final    Alkaline Phosphatase 08/14/2024 94  39 - 117 U/L Final    Total Bilirubin 08/14/2024 <0.2  0.0 - 1.2 mg/dL Final    Globulin 08/14/2024 2.9  gm/dL Final    A/G Ratio 08/14/2024 1.2  g/dL Final    BUN/Creatinine Ratio 08/14/2024 15.6  7.0 - 25.0 Final    Anion Gap 08/14/2024 11.0  5.0 - 15.0 mmol/L Final    eGFR 08/14/2024 124.3  >60.0 mL/min/1.73 Final    HS Troponin T 08/14/2024 <6  <22 ng/L  Final    proBNP 08/14/2024 215.3  0.0 - 450.0 pg/mL Final    D-Dimer, Quantitative 08/14/2024 1.00 (H)  0.00 - 0.50 MCGFEU/mL Final    TSH 08/14/2024 2.390  0.270 - 4.200 uIU/mL Final    Extra Tube 08/14/2024 Hold for add-ons.   Final    Auto resulted.    Extra Tube 08/14/2024 hold for add-on   Final    Auto resulted    Extra Tube 08/14/2024 Hold for add-ons.   Final    Auto resulted.    Extra Tube 08/14/2024 Hold for add-ons.   Final    Auto resulted    WBC 08/14/2024 6.56  3.40 - 10.80 10*3/mm3 Final    RBC 08/14/2024 4.12 (L)  4.14 - 5.80 10*6/mm3 Final    Hemoglobin 08/14/2024 12.3 (L)  13.0 - 17.7 g/dL Final    Hematocrit 08/14/2024 36.9 (L)  37.5 - 51.0 % Final    MCV 08/14/2024 89.6  79.0 - 97.0 fL Final    MCH 08/14/2024 29.9  26.6 - 33.0 pg Final    MCHC 08/14/2024 33.3  31.5 - 35.7 g/dL Final    RDW 08/14/2024 12.7  12.3 - 15.4 % Final    RDW-SD 08/14/2024 41.6  37.0 - 54.0 fl Final    MPV 08/14/2024 9.7  6.0 - 12.0 fL Final    Platelets 08/14/2024 251  140 - 450 10*3/mm3 Final    Neutrophil % 08/14/2024 59.4  42.7 - 76.0 % Final    Lymphocyte % 08/14/2024 26.7  19.6 - 45.3 % Final    Monocyte % 08/14/2024 9.9  5.0 - 12.0 % Final    Eosinophil % 08/14/2024 2.7  0.3 - 6.2 % Final    Basophil % 08/14/2024 0.5  0.0 - 1.5 % Final    Immature Grans % 08/14/2024 0.8 (H)  0.0 - 0.5 % Final    Neutrophils, Absolute 08/14/2024 3.90  1.70 - 7.00 10*3/mm3 Final    Lymphocytes, Absolute 08/14/2024 1.75  0.70 - 3.10 10*3/mm3 Final    Monocytes, Absolute 08/14/2024 0.65  0.10 - 0.90 10*3/mm3 Final    Eosinophils, Absolute 08/14/2024 0.18  0.00 - 0.40 10*3/mm3 Final    Basophils, Absolute 08/14/2024 0.03  0.00 - 0.20 10*3/mm3 Final    Immature Grans, Absolute 08/14/2024 0.05  0.00 - 0.05 10*3/mm3 Final    nRBC 08/14/2024 0.0  0.0 - 0.2 /100 WBC Final    HS Troponin T 08/14/2024 <6  <22 ng/L Final    Troponin T Delta 08/14/2024    Final    Unable to calculate.    Ethanol 08/14/2024 <10  0 - 10 mg/dL Final    Ethanol %  08/14/2024 <0.010  % Final    THC, Screen, Urine 08/14/2024 Negative  Negative Final    Phencyclidine (PCP), Urine 08/14/2024 Negative  Negative Final    Cocaine Screen, Urine 08/14/2024 Negative  Negative Final    Methamphetamine, Ur 08/14/2024 Negative  Negative Final    Opiate Screen 08/14/2024 Negative  Negative Final    Amphetamine Screen, Urine 08/14/2024 Negative  Negative Final    Benzodiazepine Screen, Urine 08/14/2024 Negative  Negative Final    Tricyclic Antidepressants Screen 08/14/2024 Negative  Negative Final    Methadone Screen, Urine 08/14/2024 Negative  Negative Final    Barbiturates Screen, Urine 08/14/2024 Negative  Negative Final    Oxycodone Screen, Urine 08/14/2024 Negative  Negative Final    Buprenorphine, Screen, Urine 08/14/2024 Positive (A)  Negative Final    Fentanyl, Urine 08/14/2024 Negative  Negative Final                     Assessment and Plan {CC Problem List  Visit Diagnosis  ROS  Review (Popup)  Health Maintenance  Quality  BestPractice  Medications  SmartSets  SnapShot Encounters  Media :23}   1. Chest pain, unspecified type  Pt reports hx of MI and CAD, but no cardiology management, interventions    - Stress Test With Myocardial Perfusion One Day; Future  Due to COPD, dyspnea, decreased activity tolerance, BMI, and unstready gait related to edema.   Body mass index is 40.1 kg/m².    2. Shortness of breath    - Adult Transthoracic Echo Complete W/ Cont if Necessary Per Protocol; Future  - Stress Test With Myocardial Perfusion One Day; Future    3. Syncope and collapse    - Adult Transthoracic Echo Complete W/ Cont if Necessary Per Protocol; Future    4. Edema leg  Increase lasix 40 mg BID  Continue KCL as scheduled  Labs in 2 weeks  Encouraged compression stockings    - Basic Metabolic Panel; Future  - Adult Transthoracic Echo Complete W/ Cont if Necessary Per Protocol; Future    5. Palpitations  Bradycardia  Recently started on atenolol  If s/s continue will consider  holter  - Adult Transthoracic Echo Complete W/ Cont if Necessary Per Protocol; Future    6. Polysubstance abuse  Hx of IV use  [place] in recovery center on suboxone.           Follow Up {Instructions Charge Capture  Follow-up Communications :23}   Return if symptoms worsen or fail to improve.    Patient was given instructions and counseling regarding his condition or for health maintenance advice. Please see specific information pulled into the AVS if appropriate.  Patient was instructed to call the [facility] with any questions, concerns, or worsening symptoms.

## 2024-10-16 NOTE — PROGRESS NOTES
New Patient Office Visit      Date: 10/17/2024   Patient Name: Agustin Frankel  : 1985   MRN: [id number]     Chief Complaint:    Chief Complaint   Patient presents with   • Cellulitis     Left leg and he states he needs a referral. Still swelling        History of Present Illness: Agustin Frankel is a 39 y.o. male who is here today to establish care.  PMHx of CHF, hypertension, cellulits, IV drug use, COPD, anxiety and depression, history of CAD and MI.  Recently started seeing a cardiologist with [facility], Juany Alvarez.  Patient states that he is in a rush today as he is trying to make his Suboxone appointment in 45 minutes.    Patient states recently discharged from the hospital in [place] several days ago for cellulitis.  He states that he was on IV antibiotics in the hospital and was told to make an appointment with infectious disease.  Patient states he was told to come to [place] for sober living and needs a referral to an infectious disease provider locally.  Patient states he was discharged on oral Keflex   Once per day.  Patient states that his left leg is the one that had cellulitis.  He states that he has noticed improvement in his symptoms since coming to the hospital.  Denies any pain.  States he is able to walk normally.  Denies chest pain, shortness of breath, fevers or chills.  Overall feels like his condition is improving.  Does take Lasix twice daily with potassium supplement for his bilateral leg swelling which does help.  He states they are unsure of how he got the cellulitis.  He did bring paper work from the hospital with him which is not very helpful or descriptive of his stay.     Patient does have history of anxiety and depression as well.  Currently well-controlled on Celexa.  Following with cardiology currently for palpitations and leg swelling and shortness of breath.  I have ordered an echo to be scheduled.  They have also encouraged compression  stockings.      Subjective      Review of Systems:   Review of Systems   Constitutional:  Negative for chills, fatigue and fever.   Respiratory:  Negative for cough, chest tightness, shortness of breath and wheezing.    Cardiovascular:  Positive for leg swelling. Negative for chest pain and palpitations.   Gastrointestinal:  Negative for abdominal pain.   Musculoskeletal:  Negative for gait problem.   Neurological:  Negative for dizziness and light-headedness.       Past Medical History:   Past Medical History:   Diagnosis Date   • GERD (gastroesophageal reflux disease)    • History of appendectomy    • HTN (hypertension)        Past Surgical History:   Past Surgical History:   Procedure Laterality Date   • ANKLE SURGERY     • APPENDECTOMY         Family History:   Family History   Problem Relation Age of Onset   • Heart failure Mother    • Cancer Mother    • Hyperlipidemia Father    • Hypertension Father    • Diabetes Father        Social History:   Social History     Socioeconomic History   • Marital status: Legally    Tobacco Use   • Smoking status: Every Day     Current packs/day: 1.00     Average packs/day: 1 pack/day for 29.8 years (29.8 ttl pk-yrs)     Types: Cigarettes     Start date: 1995   • Smokeless tobacco: Never   Vaping Use   • Vaping status: Every Day   • Substances: Nicotine   • Devices: Ref[employer]olab-ITble tank   Substance and Sexual Activity   • Alcohol use: Never   • Drug use: Not Currently     Types: Opium   • Sexual activity: Defer       Medications:     Current Outpatient Medications:   •  Acetaminophen Extra Strength 500 MG tablet, Take 1 tablet by mouth., Disp: , Rfl:   •  Aspirin Low Dose 81 MG EC tablet, Take 1 tablet by mouth Daily., Disp: , Rfl:   •  atenolol (TENORMIN) 25 MG tablet, Take 1 tablet by mouth Daily., Disp: , Rfl:   •  buprenorphine-naloxone (SUBOXONE) 8-2 MG film film, Place 1 film under the tongue Daily., Disp: , Rfl:   •  buprenorphine-naloxone (SUBOXONE) 8-2 MG per SL  "tablet, Place 1 tablet under the tongue Daily., Disp: , Rfl:   •  citalopram (CeleXA) 20 MG tablet, Take 1 tablet by mouth Daily., Disp: , Rfl:   •  furosemide (LASIX) 40 MG tablet, Take 1 tablet by mouth 2 (Two) Times a Day., Disp: 60 tablet, Rfl: 2  •  HM Senna 8.6 MG tablet, Take 1 tablet by mouth Daily., Disp: , Rfl:   •  hydroCHLOROthiazide 25 MG tablet, Take 1 tablet by mouth Daily., Disp: , Rfl:   •  omeprazole (priLOSEC) 20 MG capsule, Take 1 capsule by mouth Daily., Disp: , Rfl:   •  potassium chloride (KLOR-CON M20) 20 MEQ CR tablet, Take 1 tablet by mouth Daily., Disp: , Rfl:   •  Ventolin  (90 Base) MCG/ACT inhaler, Inhale 2 puffs Every 4 (Four) Hours As Needed., Disp: , Rfl:   •  doxycycline (VIBRAMYCIN) 100 MG capsule, Take 1 capsule by mouth 2 (Two) Times a Day for 7 days., Disp: 14 capsule, Rfl: 0    Allergies:   Allergies   Allergen Reactions   • Coconut Anaphylaxis       Objective     Physical Exam:  Vital Signs:   Vitals:    10/17/24 1250 10/17/24 1349   BP: 111/61    Pulse: 57    Temp:  97.2 °F (36.2 °C)   SpO2: 96%    Weight: 113 kg (249 lb 12.8 oz)    Height: 170.2 cm (67.01\")      Body mass index is 39.11 kg/m².  Class 3 Severe Obesity (BMI >=40). Obesity-related health conditions include the following: hypertension, coronary heart disease, and GERD. Obesity is newly identified. BMI is is above average; BMI management plan is completed. We discussed portion control and increasing exercise.       Physical Exam  Vitals reviewed.   Constitutional:       General: He is not in acute distress.     Appearance: Normal appearance. He is not toxic-appearing or diaphoretic.   HENT:      Head: Normocephalic and atraumatic.   Eyes:      Pupils: Pupils are equal, round, and reactive to light.   Cardiovascular:      Rate and Rhythm: Normal rate and regular rhythm.      Pulses: Normal pulses.      Heart sounds: Normal heart sounds.   Pulmonary:      Effort: Pulmonary effort is normal. No respiratory " distress.      Breath sounds: Normal breath sounds. No wheezing.   Abdominal:      General: Abdomen is flat. Bowel sounds are normal.   Musculoskeletal:      Right lower leg: No edema.      Left lower leg: Edema present.      Comments: LLE swollen and red. Not TTP.    Skin:     General: Skin is warm.   Neurological:      General: No focal deficit present.      Mental Status: He is alert and oriented to person, place, and time.   Psychiatric:         Mood and Affect: Mood normal.                      Assessment / Plan      Assessment/Plan:   Diagnoses and all orders for this visit:    1. Cellulitis of left leg (Primary)  -     Ambulatory Referral to Infectious Disease  -     CBC Auto Differential; Future  -     Comprehensive Metabolic Panel; Future  -     proBNP; Future  -     Sedimentation rate, automated; Future  -     C-reactive Protein; Future  -     doxycycline (VIBRAMYCIN) 100 MG capsule; Take 1 capsule by mouth 2 (Two) Times a Day for 7 days.  Dispense: 14 capsule; Refill: 0    He is hemodynamically stable, in no acute distress and afebrile.  Discussed with patient my concern for his leg.  Still has significant redness and swelling to the entirety of the lower calf and foot.  Patient states that this is improving from his previous state.  He states his pain is also improved.  He states he is able to walk much better on it.  I would like to add on doxycycline today to his regimen.  Would like him to take twice daily for 7 days.  I would like to see him back in 1 week for recheck.  Urgent evaluation by infectious disease placed.  Discussed potential side effects and adverse reactions with doxycycline.  I discussed with the patient that if his symptoms worsen, develops pain, develops fevers or chills or bodyaches he needs to go to the emergency room for urgent evaluation.  He states that he will do this.  I encouraged him to get blood work done today to to check white blood cell count.  He verbalized  understanding and agreed to plan.    Follow Up:   Return in about 1 week (around 10/24/2024).    Annie Brumfield PA-C   [facility]

## 2024-10-17 ENCOUNTER — LAB (OUTPATIENT)
Dept: LAB | Facility: HOSPITAL | Age: 39
End: 2024-10-17
Payer: MEDICAID

## 2024-10-17 ENCOUNTER — OFFICE VISIT (OUTPATIENT)
Dept: FAMILY MEDICINE CLINIC | Facility: CLINIC | Age: 39
End: 2024-10-17
Payer: MEDICAID

## 2024-10-17 VITALS
BODY MASS INDEX: 39.21 KG/M2 | OXYGEN SATURATION: 96 % | WEIGHT: 249.8 LBS | DIASTOLIC BLOOD PRESSURE: 61 MMHG | HEART RATE: 57 BPM | HEIGHT: 67 IN | TEMPERATURE: 97.2 F | SYSTOLIC BLOOD PRESSURE: 111 MMHG

## 2024-10-17 DIAGNOSIS — L03.116 CELLULITIS OF LEFT LEG: Primary | ICD-10-CM

## 2024-10-17 DIAGNOSIS — L03.116 CELLULITIS OF LEFT LEG: ICD-10-CM

## 2024-10-17 PROBLEM — F15.20 METHAMPHETAMINE DEPENDENCE: Status: ACTIVE | Noted: 2024-05-16

## 2024-10-17 PROBLEM — J44.9 CHRONIC OBSTRUCTIVE PULMONARY DISEASE: Status: ACTIVE | Noted: 2024-05-16

## 2024-10-17 PROBLEM — F41.8 MIXED ANXIETY AND DEPRESSIVE DISORDER: Status: ACTIVE | Noted: 2024-05-16

## 2024-10-17 LAB
BASOPHILS # BLD AUTO: 0.05 10*3/MM3 (ref 0–0.2)
BASOPHILS NFR BLD AUTO: 0.6 % (ref 0–1.5)
DEPRECATED RDW RBC AUTO: 41.3 FL (ref 37–54)
EOSINOPHIL # BLD AUTO: 0.31 10*3/MM3 (ref 0–0.4)
EOSINOPHIL NFR BLD AUTO: 3.9 % (ref 0.3–6.2)
ERYTHROCYTE [DISTWIDTH] IN BLOOD BY AUTOMATED COUNT: 13.1 % (ref 12.3–15.4)
ERYTHROCYTE [SEDIMENTATION RATE] IN BLOOD: 53 MM/HR (ref 0–15)
HCT VFR BLD AUTO: 37.6 % (ref 37.5–51)
HGB BLD-MCNC: 12.5 G/DL (ref 13–17.7)
IMM GRANULOCYTES # BLD AUTO: 0.05 10*3/MM3 (ref 0–0.05)
IMM GRANULOCYTES NFR BLD AUTO: 0.6 % (ref 0–0.5)
LYMPHOCYTES # BLD AUTO: 2.27 10*3/MM3 (ref 0.7–3.1)
LYMPHOCYTES NFR BLD AUTO: 28.8 % (ref 19.6–45.3)
MCH RBC QN AUTO: 28.9 PG (ref 26.6–33)
MCHC RBC AUTO-ENTMCNC: 33.2 G/DL (ref 31.5–35.7)
MCV RBC AUTO: 86.8 FL (ref 79–97)
MONOCYTES # BLD AUTO: 0.83 10*3/MM3 (ref 0.1–0.9)
MONOCYTES NFR BLD AUTO: 10.5 % (ref 5–12)
NEUTROPHILS NFR BLD AUTO: 4.37 10*3/MM3 (ref 1.7–7)
NEUTROPHILS NFR BLD AUTO: 55.6 % (ref 42.7–76)
NRBC BLD AUTO-RTO: 0 /100 WBC (ref 0–0.2)
PLATELET # BLD AUTO: 334 10*3/MM3 (ref 140–450)
PMV BLD AUTO: 9.4 FL (ref 6–12)
RBC # BLD AUTO: 4.33 10*6/MM3 (ref 4.14–5.8)
WBC NRBC COR # BLD AUTO: 7.88 10*3/MM3 (ref 3.4–10.8)

## 2024-10-17 PROCEDURE — 1160F RVW MEDS BY RX/DR IN RCRD: CPT

## 2024-10-17 PROCEDURE — 80053 COMPREHEN METABOLIC PANEL: CPT

## 2024-10-17 PROCEDURE — 1159F MED LIST DOCD IN RCRD: CPT

## 2024-10-17 PROCEDURE — 99214 OFFICE O/P EST MOD 30 MIN: CPT

## 2024-10-17 PROCEDURE — 1125F AMNT PAIN NOTED PAIN PRSNT: CPT

## 2024-10-17 PROCEDURE — 85652 RBC SED RATE AUTOMATED: CPT

## 2024-10-17 PROCEDURE — 85025 COMPLETE CBC W/AUTO DIFF WBC: CPT

## 2024-10-17 PROCEDURE — 36415 COLL VENOUS BLD VENIPUNCTURE: CPT

## 2024-10-17 PROCEDURE — 83880 ASSAY OF NATRIURETIC PEPTIDE: CPT

## 2024-10-17 PROCEDURE — 86140 C-REACTIVE PROTEIN: CPT

## 2024-10-17 RX ORDER — DOXYCYCLINE 100 MG/1
100 CAPSULE ORAL 2 TIMES DAILY
Qty: 14 CAPSULE | Refills: 0 | Status: SHIPPED | OUTPATIENT
Start: 2024-10-17 | End: 2024-10-24

## 2024-10-17 RX ORDER — POTASSIUM CHLORIDE 1500 MG/1
1 TABLET, EXTENDED RELEASE ORAL DAILY
COMMUNITY
Start: 2024-10-09

## 2024-10-18 LAB
ALBUMIN SERPL-MCNC: 3.8 G/DL (ref 3.5–5.2)
ALBUMIN/GLOB SERPL: 0.9 G/DL
ALP SERPL-CCNC: 116 U/L (ref 39–117)
ALT SERPL W P-5'-P-CCNC: 11 U/L (ref 1–41)
ANION GAP SERPL CALCULATED.3IONS-SCNC: 10.6 MMOL/L (ref 5–15)
AST SERPL-CCNC: 18 U/L (ref 1–40)
BILIRUB SERPL-MCNC: 0.3 MG/DL (ref 0–1.2)
BUN SERPL-MCNC: 15 MG/DL (ref 6–20)
BUN/CREAT SERPL: 17.2 (ref 7–25)
CALCIUM SPEC-SCNC: 9.5 MG/DL (ref 8.6–10.5)
CHLORIDE SERPL-SCNC: 101 MMOL/L (ref 98–107)
CO2 SERPL-SCNC: 26.4 MMOL/L (ref 22–29)
CREAT SERPL-MCNC: 0.87 MG/DL (ref 0.76–1.27)
CRP SERPL-MCNC: 2.17 MG/DL (ref 0–0.5)
EGFRCR SERPLBLD CKD-EPI 2021: 113.3 ML/MIN/1.73
GLOBULIN UR ELPH-MCNC: 4.4 GM/DL
GLUCOSE SERPL-MCNC: 85 MG/DL (ref 65–99)
NT-PROBNP SERPL-MCNC: 127 PG/ML (ref 0–450)
POTASSIUM SERPL-SCNC: 4.7 MMOL/L (ref 3.5–5.2)
PROT SERPL-MCNC: 8.2 G/DL (ref 6–8.5)
SODIUM SERPL-SCNC: 138 MMOL/L (ref 136–145)

## 2024-11-03 ENCOUNTER — READMISSION MANAGEMENT (OUTPATIENT)
Dept: CALL CENTER | Facility: HOSPITAL | Age: 39
End: 2024-11-03
Payer: MEDICAID

## 2024-11-03 NOTE — OUTREACH NOTE
Prep Survey      Flowsheet Row Responses   [facility] patient discharged from? Non-[facility]   Is LACE score < 7 ? Non-BH Discharge   Eligibility Not Eligible   What are the reasons patient is not eligible? Other  [returning to [facility].]   Does the patient have one of the following disease processes/diagnoses(primary or secondary)? Other   Prep survey completed? Yes            Opal EDGE - Registered Nurse

## 2024-11-08 ENCOUNTER — TELEPHONE (OUTPATIENT)
Dept: CARDIOLOGY | Facility: HOSPITAL | Age: 39
End: 2024-11-08
Payer: MEDICAID

## 2024-11-10 ENCOUNTER — APPOINTMENT (OUTPATIENT)
Dept: GENERAL RADIOLOGY | Facility: HOSPITAL | Age: 39
End: 2024-11-10
Payer: MEDICAID

## 2024-11-10 ENCOUNTER — HOSPITAL ENCOUNTER (EMERGENCY)
Facility: HOSPITAL | Age: 39
Discharge: HOME | End: 2024-11-10
Attending: EMERGENCY MEDICINE | Admitting: EMERGENCY MEDICINE
Payer: MEDICAID

## 2024-11-10 VITALS
WEIGHT: 252.65 LBS | TEMPERATURE: 98.5 F | HEIGHT: 67 IN | RESPIRATION RATE: 20 BRPM | BODY MASS INDEX: 39.65 KG/M2 | SYSTOLIC BLOOD PRESSURE: 109 MMHG | OXYGEN SATURATION: 98 % | HEART RATE: 88 BPM | DIASTOLIC BLOOD PRESSURE: 63 MMHG

## 2024-11-10 DIAGNOSIS — L03.116 LEFT LEG CELLULITIS: Primary | ICD-10-CM

## 2024-11-10 LAB
ALBUMIN SERPL-MCNC: 3.9 G/DL (ref 3.5–5.2)
ALBUMIN/GLOB SERPL: 0.9 G/DL
ALP SERPL-CCNC: 97 U/L (ref 39–117)
ALT SERPL W P-5'-P-CCNC: 10 U/L (ref 1–41)
ANION GAP SERPL CALCULATED.3IONS-SCNC: 9.6 MMOL/L (ref 5–15)
AST SERPL-CCNC: 16 U/L (ref 1–40)
BASOPHILS # BLD AUTO: 0.03 10*3/MM3 (ref 0–0.2)
BASOPHILS NFR BLD AUTO: 0.4 % (ref 0–1.5)
BILIRUB SERPL-MCNC: 0.4 MG/DL (ref 0–1.2)
BUN SERPL-MCNC: 10 MG/DL (ref 6–20)
BUN/CREAT SERPL: 12.5 (ref 7–25)
CALCIUM SPEC-SCNC: 9.1 MG/DL (ref 8.6–10.5)
CHLORIDE SERPL-SCNC: 100 MMOL/L (ref 98–107)
CO2 SERPL-SCNC: 26.4 MMOL/L (ref 22–29)
CREAT SERPL-MCNC: 0.8 MG/DL (ref 0.76–1.27)
CRP SERPL-MCNC: 2.74 MG/DL (ref 0–0.5)
D-LACTATE SERPL-SCNC: 0.8 MMOL/L (ref 0.5–2)
DEPRECATED RDW RBC AUTO: 43.3 FL (ref 37–54)
EGFRCR SERPLBLD CKD-EPI 2021: 116.2 ML/MIN/1.73
EOSINOPHIL # BLD AUTO: 0.24 10*3/MM3 (ref 0–0.4)
EOSINOPHIL NFR BLD AUTO: 3.1 % (ref 0.3–6.2)
ERYTHROCYTE [DISTWIDTH] IN BLOOD BY AUTOMATED COUNT: 14 % (ref 12.3–15.4)
ERYTHROCYTE [SEDIMENTATION RATE] IN BLOOD: 32 MM/HR (ref 0–15)
GLOBULIN UR ELPH-MCNC: 4.4 GM/DL
GLUCOSE SERPL-MCNC: 102 MG/DL (ref 65–99)
HCT VFR BLD AUTO: 38.1 % (ref 37.5–51)
HGB BLD-MCNC: 12.6 G/DL (ref 13–17.7)
HOLD SPECIMEN: NORMAL
HOLD SPECIMEN: NORMAL
IMM GRANULOCYTES # BLD AUTO: 0.02 10*3/MM3 (ref 0–0.05)
IMM GRANULOCYTES NFR BLD AUTO: 0.3 % (ref 0–0.5)
LYMPHOCYTES # BLD AUTO: 2.51 10*3/MM3 (ref 0.7–3.1)
LYMPHOCYTES NFR BLD AUTO: 32.4 % (ref 19.6–45.3)
MCH RBC QN AUTO: 28.1 PG (ref 26.6–33)
MCHC RBC AUTO-ENTMCNC: 33.1 G/DL (ref 31.5–35.7)
MCV RBC AUTO: 85 FL (ref 79–97)
MONOCYTES # BLD AUTO: 0.74 10*3/MM3 (ref 0.1–0.9)
MONOCYTES NFR BLD AUTO: 9.6 % (ref 5–12)
NEUTROPHILS NFR BLD AUTO: 4.2 10*3/MM3 (ref 1.7–7)
NEUTROPHILS NFR BLD AUTO: 54.2 % (ref 42.7–76)
NRBC BLD AUTO-RTO: 0 /100 WBC (ref 0–0.2)
NT-PROBNP SERPL-MCNC: 67.8 PG/ML (ref 0–450)
PLATELET # BLD AUTO: 311 10*3/MM3 (ref 140–450)
PMV BLD AUTO: 8.9 FL (ref 6–12)
POTASSIUM SERPL-SCNC: 3.5 MMOL/L (ref 3.5–5.2)
PROT SERPL-MCNC: 8.3 G/DL (ref 6–8.5)
RBC # BLD AUTO: 4.48 10*6/MM3 (ref 4.14–5.8)
SODIUM SERPL-SCNC: 136 MMOL/L (ref 136–145)
WBC NRBC COR # BLD AUTO: 7.74 10*3/MM3 (ref 3.4–10.8)
WHOLE BLOOD HOLD COAG: NORMAL
WHOLE BLOOD HOLD SPECIMEN: NORMAL

## 2024-11-10 PROCEDURE — 86140 C-REACTIVE PROTEIN: CPT

## 2024-11-10 PROCEDURE — 80053 COMPREHEN METABOLIC PANEL: CPT

## 2024-11-10 PROCEDURE — 85652 RBC SED RATE AUTOMATED: CPT

## 2024-11-10 PROCEDURE — 83880 ASSAY OF NATRIURETIC PEPTIDE: CPT

## 2024-11-10 PROCEDURE — 85025 COMPLETE CBC W/AUTO DIFF WBC: CPT

## 2024-11-10 PROCEDURE — 83605 ASSAY OF LACTIC ACID: CPT

## 2024-11-10 PROCEDURE — 99283 EMERGENCY DEPT VISIT LOW MDM: CPT

## 2024-11-10 PROCEDURE — 36415 COLL VENOUS BLD VENIPUNCTURE: CPT

## 2024-11-10 PROCEDURE — 73610 X-RAY EXAM OF ANKLE: CPT

## 2024-11-10 RX ORDER — CEPHALEXIN 500 MG/1
500 CAPSULE ORAL 3 TIMES DAILY
Qty: 21 CAPSULE | Refills: 0 | Status: SHIPPED | OUTPATIENT
Start: 2024-11-10 | End: 2024-11-10

## 2024-11-10 RX ORDER — OXCARBAZEPINE 150 MG/1
1 TABLET, FILM COATED ORAL EVERY 12 HOURS SCHEDULED
COMMUNITY
Start: 2024-11-09

## 2024-11-10 RX ORDER — CEPHALEXIN 500 MG/1
500 CAPSULE ORAL 3 TIMES DAILY
Qty: 21 CAPSULE | Refills: 0 | Status: SHIPPED | OUTPATIENT
Start: 2024-11-10 | End: 2024-11-17

## 2024-11-10 RX ORDER — BUPRENORPHINE 300 MG/1
SOLUTION SUBCUTANEOUS
COMMUNITY
Start: 2024-11-07

## 2024-11-10 NOTE — ED PROVIDER NOTES
Time: 2:43 PM EST  Date of encounter:  11/10/2024  Independent Historian/Clinical History and Information was obtained by:   Patient    History is limited by: N/A    Chief Complaint   Patient presents with    Leg Swelling         History of Present Illness:  Patient is a 39 y.o. year old male who presents to the emergency department for evaluation of left leg swelling and pain.  Patient reports he had surgery on his left ankle a year ago and has been swollen since.  He reports that he recently went septic because of cellulitis to the left lower extremity and became unresponsive.  Patient reports he was released from the hospital and has had difficulty with people telling him to take different medications and what not to take.  He reports he doubled on his dose of Lasix to help with the swelling and it helped some but he still has redness and pain to the left lower extremity.  He reports he had multiple episodes of nonbloody nonbilious emesis yesterday and feels like he may be infected again.  He reports he is currently taking leftover antibiotics but is unsure which one.     (Provider in triage, Zuri Agrawal [facility])      Patient Care Team  Primary Care Provider: Annie Brumfield PA-C    Past Medical History:     Allergies   Allergen Reactions    Coconut Anaphylaxis     Past Medical History:   Diagnosis Date    GERD (gastroesophageal reflux disease)     History of appendectomy     HTN (hypertension)      Past Surgical History:   Procedure Laterality Date    ANKLE SURGERY      APPENDECTOMY       Family History   Problem Relation Age of Onset    Heart failure Mother     Cancer Mother     Hyperlipidemia Father     Hypertension Father     Diabetes Father        Home Medications:  Prior to Admission medications    Medication Sig Start Date End Date Taking? Authorizing Provider   Acetaminophen Extra Strength 500 MG tablet Take 1 tablet by mouth. 5/23/24   Provider, MD Raheel   Aspirin Low Dose 81 MG EC tablet Take  1 tablet by mouth Daily. 9/5/24   Raheel Nowak MD   atenolol (TENORMIN) 25 MG tablet Take 1 tablet by mouth Daily. 9/5/24   Raheel Nowak MD   buprenorphine-naloxone (SUBOXONE) 8-2 MG film film Place 1 film under the tongue Daily. 5/20/24   Raheel Nowak MD   buprenorphine-naloxone (SUBOXONE) 8-2 MG per SL tablet Place 1 tablet under the tongue Daily.    Raheel Nowak MD   citalopram (CeleXA) 20 MG tablet Take 1 tablet by mouth Daily. 5/17/24   Raheel Nowak MD   furosemide (LASIX) 40 MG tablet Take 1 tablet by mouth 2 (Two) Times a Day. 9/12/24   Reynaldo Alvarez APRN   HM Senna 8.6 MG tablet Take 1 tablet by mouth Daily. 4/29/24   Raheel Nowak MD   hydroCHLOROthiazide 25 MG tablet Take 1 tablet by mouth Daily. 5/20/24   Raheel Nowak MD   omeprazole (priLOSEC) 20 MG capsule Take 1 capsule by mouth Daily. 5/17/24   Raheel Nowak MD   potassium chloride (KLOR-CON M20) 20 MEQ CR tablet Take 1 tablet by mouth Daily. 10/9/24   Raheel Nowak MD   Ventolin  (90 Base) MCG/ACT inhaler Inhale 2 puffs Every 4 (Four) Hours As Needed. 5/17/24   Raheel Nowak MD        Social History:   Social History     Tobacco Use    Smoking status: Every Day     Current packs/day: 1.00     Average packs/day: 1 pack/day for 29.9 years (29.9 ttl pk-yrs)     Types: Cigarettes     Start date: 1995    Smokeless tobacco: Never   Vaping Use    Vaping status: Every Day    Substances: Nicotine    Devices: RefAppy Coupleble tank   Substance Use Topics    Alcohol use: Never    Drug use: Not Currently     Types: Opium         Review of Systems:  Review of Systems   Constitutional:  Positive for activity change. Negative for appetite change, chills, fatigue and fever.   HENT:  Negative for congestion, rhinorrhea and sore throat.    Eyes:  Negative for photophobia.   Respiratory:  Negative for apnea, cough, chest tightness and shortness of breath.    Cardiovascular:   "Positive for leg swelling. Negative for chest pain and palpitations.   Gastrointestinal:  Positive for nausea and vomiting. Negative for abdominal pain and diarrhea.   Endocrine: Negative.    Genitourinary:  Negative for difficulty urinating and dysuria.   Musculoskeletal:  Negative for back pain, joint swelling and myalgias.   Skin:  Negative for color change and wound.   Allergic/Immunologic: Negative.    Neurological:  Negative for seizures and headaches.   Psychiatric/Behavioral: Negative.     All other systems reviewed and are negative.       Physical Exam:  /63   Pulse 88   Temp 98.5 °F (36.9 °C) (Oral)   Resp 20   Ht 170.2 cm (67\")   Wt 115 kg (252 lb 10.4 oz)   SpO2 98%   BMI 39.57 kg/m²         Physical Exam  Vitals and nursing note reviewed.   Constitutional:       General: He is awake.      Appearance: Normal appearance.   HENT:      Head: Normocephalic and atraumatic.      Nose: Nose normal.      Mouth/Throat:      Mouth: Mucous membranes are moist.   Eyes:      Extraocular Movements: Extraocular movements intact.      Conjunctiva/sclera: Conjunctivae normal.      Pupils: Pupils are equal, round, and reactive to light.   Cardiovascular:      Rate and Rhythm: Normal rate and regular rhythm.      Heart sounds: Normal heart sounds.   Pulmonary:      Effort: Pulmonary effort is normal. No respiratory distress.      Breath sounds: Normal breath sounds. No wheezing, rhonchi or rales.   Abdominal:      General: Bowel sounds are normal. There is no distension.      Palpations: Abdomen is soft.      Tenderness: There is no abdominal tenderness. There is no guarding or rebound.      Comments: No rigidity   Musculoskeletal:         General: No tenderness. Normal range of motion.      Cervical back: Normal range of motion and neck supple.   Skin:     General: Skin is warm and dry.      Coloration: Skin is not cyanotic or jaundiced.      Findings: Erythema present.      Comments: Erythema to left lower " extremity, 2+ pitting edema   Neurological:      General: No focal deficit present.      Mental Status: He is alert and oriented to person, place, and time. Mental status is at baseline.   Psychiatric:         Attention and Perception: Attention and perception normal.         Mood and Affect: Mood normal.                      Procedures:  Procedures      Medical Decision Making:      Comorbidities that affect care:    GERD, hypertension, chronic leg cellulitis    External Notes reviewed:    Previous Clinic Note: Family medicine office visit 10/17/2024.  Description: Left leg cellulitis.    Previous radiology:  US Venous Doppler Lower Extremity Bilateral (duplex)  Order: [id number]  Impression    No DVT.    CRITICAL RESULT:  No.    COMMUNICATION:  Per this written report.    Drafted by Tarun Pearson MD on 11/4/2024 12:34 PM  Final report signed by Tarun Pearson MD on 11/4/2024 12:34 PM  Narrative    CLINICAL INDICATION:  Acute Limb Swelling    TECHNIQUE:  Multiplanar gray scale and Doppler vascular sonographic imaging and spectral analysis of the deep veins of left lower extremity, from groin to calf, without and with compression.      COMPARISON:  None.    FINDINGS:  The visualized deep veins demonstrate flow and are compressible. No evidence of deep venous thrombosis.    Soft tissue swelling is noted. Prominent inguinal lymph nodes likely reactive.  Exam End: 11/04/24 12:32 Last Resulted: 11/04/24 12:34   Received From: [facility]  Result Received: 11/10/24 14:23           The following orders were placed and all results were independently analyzed by me:  Orders Placed This Encounter   Procedures    XR Ankle 3+ View Left    Comprehensive Metabolic Panel    [place] Draw    BNP    Lactic Acid, Plasma    Sedimentation Rate    C-reactive Protein    CBC Auto Differential    Please feed patient prior to discharge  Nursing Communication    CBC & Differential    Green Top (Gel)    Lavender Top    Gold Top - [facility]    Light  Blue Top       Medications Given in the Emergency Department:  Medications - No data to display     ED Course:    The patient was initially evaluated in the triage area where orders were placed. The patient was later dispositioned by Lior Portillo MD.      The patient was advised to stay for completion of workup which includes but is not limited to communication of labs and radiological results, reassessment and plan. The patient was advised that leaving prior to disposition by a provider could result in critical findings that are not communicated to the patient.     ED Course as of 11/10/24 2245   Sun Nov 10, 2024   1442 PROVIDER IN TRIAGE  Patient was evaluated by me in triage Zuri Agrawal PA-C.  Orders are placed and patient is currently awaiting final results and disposition.   [AS]      ED Course User Index  [AS] Zuri Agrawal PA-C       Labs:    Lab Results (last 24 hours)       Procedure Component Value Units Date/Time    CBC & Differential [[id number]]  (Abnormal) Collected: 11/10/24 1450    Specimen: Blood from Arm, Right Updated: 11/10/24 1457    Narrative:      The following orders were created for panel order CBC & Differential.  Procedure                               Abnormality         Status                     ---------                               -----------         ------                     CBC Auto Differential[[id number]]        Abnormal            Final result                 Please view results for these tests on the individual orders.    Comprehensive Metabolic Panel [[id number]]  (Abnormal) Collected: 11/10/24 1450    Specimen: Blood from Arm, Right Updated: 11/10/24 1521     Glucose 102 mg/dL      BUN 10 mg/dL      Creatinine 0.80 mg/dL      Sodium 136 mmol/L      Potassium 3.5 mmol/L      Chloride 100 mmol/L      CO2 26.4 mmol/L      Calcium 9.1 mg/dL      Total Protein 8.3 g/dL      Albumin 3.9 g/dL      ALT (SGPT) 10 U/L      AST (SGOT) 16 U/L      Alkaline Phosphatase 97  U/L      Total Bilirubin 0.4 mg/dL      Globulin 4.4 gm/dL      A/G Ratio 0.9 g/dL      BUN/Creatinine Ratio 12.5     Anion Gap 9.6 mmol/L      eGFR 116.2 mL/min/1.73     Narrative:      GFR Normal >60  Chronic Kidney Disease <60  Kidney Failure <15      BNP [[id number]]  (Normal) Collected: 11/10/24 1450    Specimen: Blood from Arm, Right Updated: 11/10/24 1516     proBNP 67.8 pg/mL     Narrative:      This assay is used as an aid in the diagnosis of individuals suspected of having heart failure. It can be used as an aid in the diagnosis of acute decompensated heart failure (ADHF) in patients presenting with signs and symptoms of ADHF to the emergency department (ED). In addition, NT-proBNP of <300 pg/mL indicates ADHF is not likely.    Age Range Result Interpretation  NT-proBNP Concentration (pg/mL:      <50             Positive            >450                   Gray                 300-450                    Negative             <300    50-75           Positive            >900                  Gray                300-900                  Negative            <300      >75             Positive            >1800                  Gray                300-1800                  Negative            <300    Lactic Acid, Plasma [[id number]]  (Normal) Collected: 11/10/24 1450    Specimen: Blood from Arm, Right Updated: 11/10/24 1511     Lactate 0.8 mmol/L     Sedimentation Rate [[id number]]  (Abnormal) Collected: 11/10/24 1450    Specimen: Blood from Arm, Right Updated: 11/10/24 1504     Sed Rate 32 mm/hr     C-reactive Protein [[id number]]  (Abnormal) Collected: 11/10/24 1450    Specimen: Blood from Arm, Right Updated: 11/10/24 1521     C-Reactive Protein 2.74 mg/dL     CBC Auto Differential [[id number]]  (Abnormal) Collected: 11/10/24 1450    Specimen: Blood from Arm, Right Updated: 11/10/24 1457     WBC 7.74 10*3/mm3      RBC 4.48 10*6/mm3      Hemoglobin 12.6 g/dL      Hematocrit 38.1 %      MCV 85.0 fL      MCH 28.1 pg       MCHC 33.1 g/dL      RDW 14.0 %      RDW-SD 43.3 fl      MPV 8.9 fL      Platelets 311 10*3/mm3      Neutrophil % 54.2 %      Lymphocyte % 32.4 %      Monocyte % 9.6 %      Eosinophil % 3.1 %      Basophil % 0.4 %      Immature Grans % 0.3 %      Neutrophils, Absolute 4.20 10*3/mm3      Lymphocytes, Absolute 2.51 10*3/mm3      Monocytes, Absolute 0.74 10*3/mm3      Eosinophils, Absolute 0.24 10*3/mm3      Basophils, Absolute 0.03 10*3/mm3      Immature Grans, Absolute 0.02 10*3/mm3      nRBC 0.0 /100 WBC              Imaging:    XR Ankle 3+ View Left    Result Date: 11/10/2024  XR ANKLE 3+ VW LEFT Date of Exam: 11/10/2024 3:04 PM EST Indication: hx of surgical repair, red and swollen Comparison: None available. Findings: There is diffuse soft tissue swelling. There is no soft tissue gas. There is been internal fixation of a fracture of the distal fibula. The plate and screws are intact and well seated. There are bone tunnels with 2 suture anchors along the medial aspect of the distal tibia related to syndesmotic stabilization. The fracture is healed. There is no acute fracture. There is no destructive bony process or periostitis. There is some ossification of the interosseous membrane. The ankle joint is intact     Impression: 1. Soft tissue swelling 2. No acute bony abnormality 3. Status post internal fixation of the distal fibula fracture with syndesmotic stabilization Electronically Signed: Ector Campos  11/10/2024 3:34 PM EST  Workstation ID: [id number]       Differential Diagnosis and Discussion:      Rash: Differential diagnosis includes but is not limited to sepsis, cellulitis, [place] Spotted Fever, meningitis, meningococcemia, Varicella, Strep infection, dermatitis, allergic reaction, Lyme disease, and toxic shock syndrome.    All labs were reviewed and interpreted by me.  All X-rays impressions were independently interpreted by me.    MDM                   Patient Care Considerations:    SEPSIS was  considered but is NOT present in the emergency department as SIRS criteria is not present.      Consultants/Shared Management Plan:    None    Social Determinants of Health:    Patient is independent, reliable, and has access to care.       Disposition and Care Coordination:    Discharged: The patient is suitable and stable for discharge with no need for consideration of admission.    I have explained the patient´s condition, diagnoses and treatment plan based on the information available to me at this time. I have answered questions and addressed any concerns. The patient has a good  understanding of the patient´s diagnosis, condition, and treatment plan as can be expected at this point. The vital signs have been stable. The patient´s condition is stable and appropriate for discharge from the emergency department.      The patient will pursue further outpatient evaluation with the primary care physician or other designated or consulting physician as outlined in the discharge instructions. They are agreeable to this plan of care and follow-up instructions have been explained in detail. The patient has received these instructions in written format and has expressed an understanding of the discharge instructions. The patient is aware that any significant change in condition or worsening of symptoms should prompt an immediate return to this or the closest emergency department or call to 911.    Final diagnoses:   Left leg cellulitis        ED Disposition       ED Disposition   Discharge    Condition   Stable    Comment   --               This medical record created using voice recognition software.             Lior Portillo MD  11/10/24 0711

## 2024-11-10 NOTE — Clinical Note
[facility] EMERGENCY ROOM  [address] [place] [address]  Phone: [phone]  Fax: [phone]    Agustin Frankel was seen and treated in our emergency department on 11/10/2024.  He may return to work on 11/12/2024.         Thank you for choosing [facility].    Lior Portillo MD

## 2024-11-10 NOTE — Clinical Note
[facility] EMERGENCY ROOM  [address] [place] [address]  Phone: [phone]  Fax: [phone]    Agustin Frankel was seen and treated in our emergency department on 11/10/2024.  He may return to work on 11/12/2024.         Thank you for choosing [facility].    Lior Portillo MD

## 2024-11-13 ENCOUNTER — OFFICE VISIT (OUTPATIENT)
Dept: FAMILY MEDICINE CLINIC | Facility: CLINIC | Age: 39
End: 2024-11-13
Payer: MEDICAID

## 2024-11-13 VITALS
OXYGEN SATURATION: 95 % | HEIGHT: 67 IN | SYSTOLIC BLOOD PRESSURE: 96 MMHG | DIASTOLIC BLOOD PRESSURE: 45 MMHG | WEIGHT: 256.8 LBS | BODY MASS INDEX: 40.3 KG/M2 | TEMPERATURE: 97.8 F | HEART RATE: 67 BPM

## 2024-11-13 DIAGNOSIS — J43.9 PULMONARY EMPHYSEMA, UNSPECIFIED EMPHYSEMA TYPE: ICD-10-CM

## 2024-11-13 DIAGNOSIS — G47.30 SLEEP APNEA, UNSPECIFIED TYPE: ICD-10-CM

## 2024-11-13 DIAGNOSIS — F17.200 SMOKING: ICD-10-CM

## 2024-11-13 DIAGNOSIS — G89.29 CHRONIC PAIN OF LEFT ANKLE: ICD-10-CM

## 2024-11-13 DIAGNOSIS — Z76.89 ENCOUNTER TO ESTABLISH CARE WITH NEW DOCTOR: Primary | ICD-10-CM

## 2024-11-13 DIAGNOSIS — R40.0 DAYTIME SLEEPINESS: ICD-10-CM

## 2024-11-13 DIAGNOSIS — M25.572 CHRONIC PAIN OF LEFT ANKLE: ICD-10-CM

## 2024-11-13 PROBLEM — E66.9 OBESITY (BMI 35.0-39.9 WITHOUT COMORBIDITY): Status: ACTIVE | Noted: 2024-08-06

## 2024-11-13 PROBLEM — G47.10 DAYTIME HYPERSOMNIA: Status: ACTIVE | Noted: 2024-08-26

## 2024-11-13 PROBLEM — R60.0 EDEMA OF LOWER EXTREMITY: Status: ACTIVE | Noted: 2024-08-22

## 2024-11-13 PROBLEM — Z87.898 HISTORY OF INTRAVENOUS DRUG USE IN REMISSION: Status: ACTIVE | Noted: 2024-11-13

## 2024-11-13 PROBLEM — L03.90 CELLULITIS: Status: ACTIVE | Noted: 2024-11-13

## 2024-11-13 PROCEDURE — 99214 OFFICE O/P EST MOD 30 MIN: CPT | Performed by: STUDENT IN AN ORGANIZED HEALTH CARE EDUCATION/TRAINING PROGRAM

## 2024-11-13 PROCEDURE — 1160F RVW MEDS BY RX/DR IN RCRD: CPT | Performed by: STUDENT IN AN ORGANIZED HEALTH CARE EDUCATION/TRAINING PROGRAM

## 2024-11-13 PROCEDURE — 1125F AMNT PAIN NOTED PAIN PRSNT: CPT | Performed by: STUDENT IN AN ORGANIZED HEALTH CARE EDUCATION/TRAINING PROGRAM

## 2024-11-13 PROCEDURE — 1159F MED LIST DOCD IN RCRD: CPT | Performed by: STUDENT IN AN ORGANIZED HEALTH CARE EDUCATION/TRAINING PROGRAM

## 2024-11-13 NOTE — PROGRESS NOTES
Adult Male Preventive Health Visit  DOS: 24    Patient: Agustin Frankel  :  1985  Age: 39 y.o.  Gender: male   MRN: [id number]    Chief Complaint:   Annual Health Maintenance  Establish Care (Pt would like referral, and sleep study) and Follow-up    Subjective   Patient is here for annual physical. Other complaints are     History of Present Illness  The patient presents for evaluation of multiple medical concerns.    He reports experiencing swelling in his legs, which he believes is due to cellulitis. He has been taking Lasix twice daily to manage this condition.He has been on Keflex and atenolol, Celexa, Trileptal, and Sublocade. He is unsure if he is currently taking hydrochlorothiazide. He has been sober for 8 months and is currently unemployed. He reports no feelings of depression or hopelessness in the past two weeks and is compliant with his medication regimen.     He underwent ankle surgery in 2023,The plan was to remove the hardware after a year, but he moved from [place]. he has visited the emergency department multiple times, currently taking antibiotics for left leg cellulitis. He is requesting a new referral to a foot and ankle specialist.    He has been experiencing excessive daytime sleepiness, which has been causing issues with his program. He believes he is not getting enough sleep at night, often waking up multiple times and feeling constantly tired. He has been diagnosed with COPD and has been advised to undergo a sleep test for CPAP. He has been using an albuterol inhaler as needed and has been experiencing shortness of breath and wheezing. He has not had a pulmonary function test before. He reports difficulty breathing when lying down at night, forcing him to sleep sitting up. He has been getting about 4 hours of sleep per night.    SOCIAL HISTORY  He smokes a pack of cigarettes a day since he was 9 years old. He is vaping now. He does not drink alcohol.    FAMILY  HISTORY  His father has diabetes and heart problems.    ALLERGIES  He is allergic to NICOTINE PATCHES.       Allergies:   Allergies   Allergen Reactions    Coconut Anaphylaxis     Medications:  Current Outpatient Medications on File Prior to Visit   Medication Sig Dispense Refill    Acetaminophen Extra Strength 500 MG tablet Take 1 tablet by mouth.      Aspirin Low Dose 81 MG EC tablet Take 1 tablet by mouth Daily.      atenolol (TENORMIN) 25 MG tablet Take 1 tablet by mouth Daily.      cephalexin (KEFLEX) 500 MG capsule Take 1 capsule by mouth 3 (Three) Times a Day for 7 days. 21 capsule 0    citalopram (CeleXA) 20 MG tablet Take 1 tablet by mouth Daily.      furosemide (LASIX) 40 MG tablet Take 1 tablet by mouth 2 (Two) Times a Day. 60 tablet 2    HM Senna 8.6 MG tablet Take 1 tablet by mouth Daily.      omeprazole (priLOSEC) 20 MG capsule Take 1 capsule by mouth Daily.      OXcarbazepine (TRILEPTAL) 150 MG tablet Take 1 tablet by mouth Every 12 (Twelve) Hours.      potassium chloride (KLOR-CON M20) 20 MEQ CR tablet Take 1 tablet by mouth Daily.      Sublocade 300 MG/1.5ML solution prefilled syringe INJECT THE CONTENTS OF 1 SYRINGE UNDER THE SKIN MONTHLY      Ventolin  (90 Base) MCG/ACT inhaler Inhale 2 puffs Every 4 (Four) Hours As Needed.      [DISCONTINUED] hydroCHLOROthiazide 25 MG tablet Take 1 tablet by mouth Daily.       No current facility-administered medications on file prior to visit.      I have reviewed and updated as appropriate the past medical, surgical, family, and social history as summarized below:  Past Medical, Social and Family History:     Past Medical History:   Diagnosis Date    GERD (gastroesophageal reflux disease)     History of appendectomy     HTN (hypertension)      Social History     Tobacco Use    Smoking status: Every Day     Current packs/day: 1.00     Average packs/day: 1 pack/day for 29.9 years (29.9 ttl pk-yrs)     Types: Cigarettes     Start date: 1995    Smokeless  "tobacco: Never   Substance Use Topics    Alcohol use: Never     Family History   Problem Relation Age of Onset    Heart failure Mother     Cancer Mother     Hyperlipidemia Father     Hypertension Father     Diabetes Father      Immunization History   Administered Date(s) Administered    Hepatitis B Adolescent High Risk Infant 06/03/1997, 07/14/1997, 12/22/1997    MMR 06/03/1997       Review of Systems   Constitutional:  Positive for activity change.        Day time sleepiness     Respiratory:  Negative for cough and wheezing.    Genitourinary:  Negative for dysuria and hematuria.   Musculoskeletal:  Negative for arthralgias and myalgias.   Skin:  Positive for wound.   Psychiatric/Behavioral:  Negative for agitation and behavioral problems.        Objective   Vital Signs:   Vitals:    11/13/24 1338   BP: 96/45   BP Location: Left arm   Patient Position: Sitting   Cuff Size: Large Adult   Pulse: 67   Temp: 97.8 °F (36.6 °C)   TempSrc: Temporal   SpO2: 95%   Weight: 116 kg (256 lb 12.8 oz)   Height: 170.2 cm (67.01\")     Body mass index is 40.21 kg/m².    Wt Readings from Last 3 Encounters:   11/13/24 116 kg (256 lb 12.8 oz)   11/10/24 115 kg (252 lb 10.4 oz)   10/17/24 113 kg (249 lb 12.8 oz)     BP Readings from Last 3 Encounters:   11/13/24 96/45   11/10/24 109/63   10/17/24 111/61       Physical Exam  Constitutional:       Appearance: Normal appearance.   HENT:      Head: Normocephalic and atraumatic.      Mouth/Throat:      Mouth: Mucous membranes are moist.   Eyes:      Pupils: Pupils are equal, round, and reactive to light.   Cardiovascular:      Rate and Rhythm: Normal rate and regular rhythm.      Pulses: Normal pulses.      Heart sounds: Normal heart sounds.   Pulmonary:      Effort: Pulmonary effort is normal.      Breath sounds: Normal breath sounds.   Abdominal:      Palpations: Abdomen is soft.   Musculoskeletal:      Right lower leg: Edema present.      Left lower leg: Edema present.   Neurological:      " General: No focal deficit present.      Mental Status: He is alert and oriented to person, place, and time.   Psychiatric:         Mood and Affect: Mood normal.         Behavior: Behavior normal.         Physical Exam         Health Maintenance   Topic Date Due    ANNUAL PHYSICAL  Never done    COVID-19 Vaccine (1 - 2024-25 season) 11/15/2024 (Originally 9/1/2024)    INFLUENZA VACCINE  03/31/2025 (Originally 8/1/2024)    Pneumococcal Vaccine 0-64 (1 of 2 - PCV) 11/13/2025 (Originally 3/7/1992)    TDAP/TD VACCINES (1 - Tdap) 11/13/2025 (Originally 3/7/2005)    BMI FOLLOWUP  10/17/2025    HEPATITIS C SCREENING  Completed       Lifestyle:  Marital Status:   Household members: lives with roomamte   Work Status: unemployed  Weight Concerns: Yes  Balanced diet: No  Regular Exercise: No        Fall Risk:  SAM Fall Risk Assessment has not been completed.    Social Screening Questions:  Agustin Frankel  reports that he has been smoking cigarettes. He started smoking about 29 years ago. He has a 29.9 pack-year smoking history. He has never used smokeless tobacco.     Drug Use: h/o drug abuse, currently sober for 8 months  Alcohol Use: none  Sexually Active: No  Sexual Preference: heterosexual  Contraception:  no method    PHQ-9 Depression Screening  Little interest or pleasure in doing things? Not at all   Feeling down, depressed, or hopeless? Not at all   PHQ-2 Total Score 0   Trouble falling or staying asleep, or sleeping too much?     Feeling tired or having little energy?     Poor appetite or overeating?     Feeling bad about yourself - or that you are a failure or have let yourself or your family down?     Trouble concentrating on things, such as reading the newspaper or watching television?     Moving or speaking so slowly that other people could have noticed? Or the opposite - being so fidgety or restless that you have been moving around a lot more than usual?     Thoughts that you would be better off  dead, or of hurting yourself in some way?     PHQ-9 Total Score     If you checked off any problems, how difficult have these problems made it for you to do your work, take care of things at home, or get along with other people?         Vision/Hearing/Dental  Regular Dental Visits: No  Vision Problems: No  Hearing Loss: No    Health Screening  Colon Cancer Screening:   Last Completed Colonoscopy       This patient has no relevant Health Maintenance data.          Colon Screening Method: N/A  Lung Cancer Screening: N/A  Prostate Cancer Screening: N/A    Safety  Smoke Detectors in Home: Yes  Carbon Monoxide Detectors in Home: Yes  Guns in Home: No  Seatbelt use: No  Sunscreen Use: No    Results  Laboratory Studies  Inflammatory markers were slightly elevated.    Imaging  Echocardiogram: Left ventricle is mildly dilated, normal left ventricular myocardial thickness, EF is 60 to 65%, diastolic function is normal, right ventricle is dilated, right ventricle systolic function is normal, all cardiac valves are reasonably well interrogated with TD imaging, no significant valve regurgitation.         Assessment   Diagnoses and all orders for this visit:    1. Encounter to establish care with new doctor (Primary)    2. Sleep apnea, unspecified type  -     Ambulatory Referral to Sleep Medicine    3. Daytime sleepiness  -     Ambulatory Referral to Sleep Medicine    4. Pulmonary emphysema, unspecified emphysema type  -     Complete PFT - Pre & Post Bronchodilator; Future    5. Smoking    6. Chronic pain of left ankle  -     Ambulatory Referral to Orthopedic Surgery        Assessment & Plan  1. Leg swelling.  His echocardiogram from last month showed a mildly dilated left ventricle, normal left ventricular myocardial thickness, an EF of 60 to 65 percent, normal diastolic function, a dilated right ventricle, normal right ventricle systolic function, and no significant valve regurgitation, his inflammatory markers were slightly  elevated. He was advised to continue his current medications, reduce water and salt intake, and avoid extra water pills. No additional blood work was ordered for today.    2. Ankle surgery follow-up.  A referral was made to [facility].    3. Excessive daytime sleepiness.  This could be due to sleep apnea or poor sleep quality at night. A sleep study was ordered to determine if he has narcolepsy or sleep apnea. A referral was made to a sleep doctor.    4. Shortness of breath.  A pulmonary function test was ordered to determine if he has COPD or asthma. He was advised to use albuterol as needed until the results are available.    5. Medication Management.  He was advised to continue taking Lasix twice daily, atenolol, Celexa, Trileptal, and Sublocade. He was instructed not to take hydrochlorothiazide due to low blood pressure.    Follow-up  Return in 1 month for follow up.       Return in about 1 month (around 12/13/2024).    Patient Care Team:  Annie Brumfield PA-C as PCP - General (Physician Assistant)  Ant Artis MD as Consulting Physician (Pulmonary Disease)    Electronically signed by Anaya Collins MD, 11/13/24, 2:04 PM EST.    Patient or patient representative verbalized consent for the use of Ambient Listening during the visit with  Anaya Collins MD for chart documentation. 11/13/2024  14:15 EST

## 2024-11-14 RX ORDER — ALBUTEROL SULFATE 90 UG/1
2 AEROSOL, METERED RESPIRATORY (INHALATION) EVERY 4 HOURS PRN
Qty: 18 G | Refills: 2 | Status: SHIPPED | OUTPATIENT
Start: 2024-11-14 | End: 2024-11-15 | Stop reason: SDUPTHER

## 2024-11-14 RX ORDER — SENNOSIDES A AND B 8.6 MG/1
1 TABLET, FILM COATED ORAL DAILY
Qty: 30 TABLET | Refills: 1 | Status: SHIPPED | OUTPATIENT
Start: 2024-11-14 | End: 2024-11-15 | Stop reason: SDUPTHER

## 2024-11-15 ENCOUNTER — TELEPHONE (OUTPATIENT)
Dept: FAMILY MEDICINE CLINIC | Facility: CLINIC | Age: 39
End: 2024-11-15
Payer: MEDICAID

## 2024-11-15 RX ORDER — SENNOSIDES A AND B 8.6 MG/1
1 TABLET, FILM COATED ORAL DAILY
Qty: 30 TABLET | Refills: 1 | Status: SHIPPED | OUTPATIENT
Start: 2024-11-15

## 2024-11-15 RX ORDER — ALBUTEROL SULFATE 90 UG/1
2 AEROSOL, METERED RESPIRATORY (INHALATION) EVERY 4 HOURS PRN
Qty: 18 G | Refills: 2 | Status: SHIPPED | OUTPATIENT
Start: 2024-11-15

## 2024-11-15 NOTE — TELEPHONE ENCOUNTER
Caller: Agustin Frankel    Relationship: Self    Best call back number: [phone]    Which medication are you concerned about:   Sennosides 8.6 MG 1 tablet Oral Daily      Albuterol Sulfate 108 (90 Base) MCG/ACT Inhale 2 puffs by mouth every 4 (Four) Hours As Needed for Wheezing or Shortness of Air.     MEDICATION WAS SENT TO THE [facility] AND PLEASE SEND TO     [facility] - [place] - [address] - [phone]  - [phone]  [phone]

## 2024-11-21 ENCOUNTER — PATIENT ROUNDING (BHMG ONLY) (OUTPATIENT)
Dept: FAMILY MEDICINE CLINIC | Facility: CLINIC | Age: 39
End: 2024-11-21
Payer: MEDICAID

## 2024-11-21 NOTE — PROGRESS NOTES
A [employer] MESSAGE HAS BEEN SENT TO THE PATIENT FOR PATIENT ROUNDING WITH [facility]

## 2024-12-26 ENCOUNTER — HOSPITAL ENCOUNTER (EMERGENCY)
Facility: HOSPITAL | Age: 39
Discharge: LEFT BEFORE DISCHARGE | End: 2024-12-26
Attending: EMERGENCY MEDICINE
Payer: MEDICAID

## 2024-12-26 PROCEDURE — 99211 OFF/OP EST MAY X REQ PHY/QHP: CPT | Performed by: EMERGENCY MEDICINE

## 2024-12-27 ENCOUNTER — HOSPITAL ENCOUNTER (EMERGENCY)
Facility: HOSPITAL | Age: 39
Discharge: HOME | End: 2024-12-27
Attending: EMERGENCY MEDICINE
Payer: MEDICAID

## 2024-12-27 ENCOUNTER — APPOINTMENT (OUTPATIENT)
Dept: CT IMAGING | Facility: HOSPITAL | Age: 39
End: 2024-12-27
Payer: MEDICAID

## 2024-12-27 ENCOUNTER — APPOINTMENT (OUTPATIENT)
Dept: GENERAL RADIOLOGY | Facility: HOSPITAL | Age: 39
End: 2024-12-27
Payer: MEDICAID

## 2024-12-27 VITALS
OXYGEN SATURATION: 92 % | HEIGHT: 67 IN | TEMPERATURE: 97.6 F | HEART RATE: 72 BPM | BODY MASS INDEX: 41.52 KG/M2 | SYSTOLIC BLOOD PRESSURE: 112 MMHG | RESPIRATION RATE: 20 BRPM | WEIGHT: 264.55 LBS | DIASTOLIC BLOOD PRESSURE: 73 MMHG

## 2024-12-27 DIAGNOSIS — R60.0 PEDAL EDEMA: ICD-10-CM

## 2024-12-27 DIAGNOSIS — J20.9 ACUTE BRONCHITIS, UNSPECIFIED ORGANISM: Primary | ICD-10-CM

## 2024-12-27 DIAGNOSIS — Z76.0 MEDICATION REFILL: ICD-10-CM

## 2024-12-27 LAB
ALBUMIN SERPL-MCNC: 4 G/DL (ref 3.5–5.2)
ALBUMIN/GLOB SERPL: 1 G/DL
ALP SERPL-CCNC: 97 U/L (ref 39–117)
ALT SERPL W P-5'-P-CCNC: 11 U/L (ref 1–41)
ANION GAP SERPL CALCULATED.3IONS-SCNC: 13.9 MMOL/L (ref 5–15)
AST SERPL-CCNC: 19 U/L (ref 1–40)
BASOPHILS # BLD AUTO: 0.05 10*3/MM3 (ref 0–0.2)
BASOPHILS NFR BLD AUTO: 0.6 % (ref 0–1.5)
BILIRUB SERPL-MCNC: 0.2 MG/DL (ref 0–1.2)
BUN SERPL-MCNC: 15 MG/DL (ref 6–20)
BUN/CREAT SERPL: 19.5 (ref 7–25)
CALCIUM SPEC-SCNC: 9 MG/DL (ref 8.6–10.5)
CHLORIDE SERPL-SCNC: 97 MMOL/L (ref 98–107)
CO2 SERPL-SCNC: 24.1 MMOL/L (ref 22–29)
CREAT SERPL-MCNC: 0.77 MG/DL (ref 0.76–1.27)
D DIMER PPP FEU-MCNC: 0.84 MCGFEU/ML (ref 0–0.5)
DEPRECATED RDW RBC AUTO: 42.9 FL (ref 37–54)
EGFRCR SERPLBLD CKD-EPI 2021: 117.5 ML/MIN/1.73
EOSINOPHIL # BLD AUTO: 0.26 10*3/MM3 (ref 0–0.4)
EOSINOPHIL NFR BLD AUTO: 3 % (ref 0.3–6.2)
ERYTHROCYTE [DISTWIDTH] IN BLOOD BY AUTOMATED COUNT: 13.9 % (ref 12.3–15.4)
FLUAV SUBTYP SPEC NAA+PROBE: NOT DETECTED
FLUBV RNA ISLT QL NAA+PROBE: NOT DETECTED
GEN 5 1HR TROPONIN T REFLEX: 7 NG/L
GLOBULIN UR ELPH-MCNC: 4.1 GM/DL
GLUCOSE SERPL-MCNC: 110 MG/DL (ref 65–99)
HCT VFR BLD AUTO: 40.3 % (ref 37.5–51)
HGB BLD-MCNC: 13.1 G/DL (ref 13–17.7)
HOLD SPECIMEN: NORMAL
HOLD SPECIMEN: NORMAL
IMM GRANULOCYTES # BLD AUTO: 0.07 10*3/MM3 (ref 0–0.05)
IMM GRANULOCYTES NFR BLD AUTO: 0.8 % (ref 0–0.5)
LYMPHOCYTES # BLD AUTO: 2.44 10*3/MM3 (ref 0.7–3.1)
LYMPHOCYTES NFR BLD AUTO: 28.5 % (ref 19.6–45.3)
MCH RBC QN AUTO: 27.5 PG (ref 26.6–33)
MCHC RBC AUTO-ENTMCNC: 32.5 G/DL (ref 31.5–35.7)
MCV RBC AUTO: 84.7 FL (ref 79–97)
MONOCYTES # BLD AUTO: 0.64 10*3/MM3 (ref 0.1–0.9)
MONOCYTES NFR BLD AUTO: 7.5 % (ref 5–12)
NEUTROPHILS NFR BLD AUTO: 5.11 10*3/MM3 (ref 1.7–7)
NEUTROPHILS NFR BLD AUTO: 59.6 % (ref 42.7–76)
NRBC BLD AUTO-RTO: 0 /100 WBC (ref 0–0.2)
NT-PROBNP SERPL-MCNC: 73.5 PG/ML (ref 0–450)
PLATELET # BLD AUTO: 288 10*3/MM3 (ref 140–450)
PMV BLD AUTO: 10 FL (ref 6–12)
POTASSIUM SERPL-SCNC: 3.8 MMOL/L (ref 3.5–5.2)
PROT SERPL-MCNC: 8.1 G/DL (ref 6–8.5)
QT INTERVAL: 403 MS
QTC INTERVAL: 462 MS
RBC # BLD AUTO: 4.76 10*6/MM3 (ref 4.14–5.8)
RSV RNA NPH QL NAA+NON-PROBE: NOT DETECTED
SARS-COV-2 RNA RESP QL NAA+PROBE: NOT DETECTED
SODIUM SERPL-SCNC: 135 MMOL/L (ref 136–145)
TROPONIN T NUMERIC DELTA: NORMAL
TROPONIN T SERPL HS-MCNC: <6 NG/L
WBC NRBC COR # BLD AUTO: 8.57 10*3/MM3 (ref 3.4–10.8)
WHOLE BLOOD HOLD COAG: NORMAL
WHOLE BLOOD HOLD SPECIMEN: NORMAL

## 2024-12-27 PROCEDURE — 96374 THER/PROPH/DIAG INJ IV PUSH: CPT

## 2024-12-27 PROCEDURE — 94761 N-INVAS EAR/PLS OXIMETRY MLT: CPT

## 2024-12-27 PROCEDURE — 96375 TX/PRO/DX INJ NEW DRUG ADDON: CPT

## 2024-12-27 PROCEDURE — 85025 COMPLETE CBC W/AUTO DIFF WBC: CPT

## 2024-12-27 PROCEDURE — 36415 COLL VENOUS BLD VENIPUNCTURE: CPT

## 2024-12-27 PROCEDURE — 94640 AIRWAY INHALATION TREATMENT: CPT

## 2024-12-27 PROCEDURE — 93005 ELECTROCARDIOGRAM TRACING: CPT | Performed by: EMERGENCY MEDICINE

## 2024-12-27 PROCEDURE — 93005 ELECTROCARDIOGRAM TRACING: CPT

## 2024-12-27 PROCEDURE — 99285 EMERGENCY DEPT VISIT HI MDM: CPT

## 2024-12-27 PROCEDURE — 84484 ASSAY OF TROPONIN QUANT: CPT

## 2024-12-27 PROCEDURE — 80053 COMPREHEN METABOLIC PANEL: CPT

## 2024-12-27 PROCEDURE — 85379 FIBRIN DEGRADATION QUANT: CPT | Performed by: EMERGENCY MEDICINE

## 2024-12-27 PROCEDURE — 71045 X-RAY EXAM CHEST 1 VIEW: CPT

## 2024-12-27 PROCEDURE — 25010000002 METHYLPREDNISOLONE PER 125 MG: Performed by: EMERGENCY MEDICINE

## 2024-12-27 PROCEDURE — 71275 CT ANGIOGRAPHY CHEST: CPT

## 2024-12-27 PROCEDURE — 83880 ASSAY OF NATRIURETIC PEPTIDE: CPT

## 2024-12-27 PROCEDURE — 94799 UNLISTED PULMONARY SVC/PX: CPT

## 2024-12-27 PROCEDURE — 87637 SARSCOV2&INF A&B&RSV AMP PRB: CPT | Performed by: EMERGENCY MEDICINE

## 2024-12-27 PROCEDURE — 25510000001 IOPAMIDOL PER 1 ML: Performed by: EMERGENCY MEDICINE

## 2024-12-27 PROCEDURE — 94664 DEMO&/EVAL PT USE INHALER: CPT

## 2024-12-27 PROCEDURE — 84484 ASSAY OF TROPONIN QUANT: CPT | Performed by: EMERGENCY MEDICINE

## 2024-12-27 PROCEDURE — 25010000002 FUROSEMIDE PER 20 MG: Performed by: EMERGENCY MEDICINE

## 2024-12-27 RX ORDER — FUROSEMIDE 10 MG/ML
40 INJECTION INTRAMUSCULAR; INTRAVENOUS ONCE
Status: COMPLETED | OUTPATIENT
Start: 2024-12-27 | End: 2024-12-27

## 2024-12-27 RX ORDER — METHYLPREDNISOLONE SODIUM SUCCINATE 125 MG/2ML
125 INJECTION, POWDER, LYOPHILIZED, FOR SOLUTION INTRAMUSCULAR; INTRAVENOUS ONCE
Status: COMPLETED | OUTPATIENT
Start: 2024-12-27 | End: 2024-12-27

## 2024-12-27 RX ORDER — SODIUM CHLORIDE 0.9 % (FLUSH) 0.9 %
10 SYRINGE (ML) INJECTION AS NEEDED
Status: DISCONTINUED | OUTPATIENT
Start: 2024-12-27 | End: 2024-12-27 | Stop reason: HOSPADM

## 2024-12-27 RX ORDER — PANTOPRAZOLE SODIUM 40 MG/1
40 TABLET, DELAYED RELEASE ORAL DAILY
Qty: 30 TABLET | Refills: 0 | Status: SHIPPED | OUTPATIENT
Start: 2024-12-27

## 2024-12-27 RX ORDER — AZITHROMYCIN 250 MG/1
TABLET, FILM COATED ORAL
Qty: 6 TABLET | Refills: 0 | Status: SHIPPED | OUTPATIENT
Start: 2024-12-27

## 2024-12-27 RX ORDER — IPRATROPIUM BROMIDE AND ALBUTEROL SULFATE 2.5; .5 MG/3ML; MG/3ML
6 SOLUTION RESPIRATORY (INHALATION) ONCE
Status: COMPLETED | OUTPATIENT
Start: 2024-12-27 | End: 2024-12-27

## 2024-12-27 RX ORDER — FUROSEMIDE 80 MG/1
80 TABLET ORAL 2 TIMES DAILY
Qty: 60 TABLET | Refills: 0 | Status: SHIPPED | OUTPATIENT
Start: 2024-12-27

## 2024-12-27 RX ORDER — ALBUTEROL SULFATE 90 UG/1
2 AEROSOL, METERED RESPIRATORY (INHALATION) EVERY 4 HOURS PRN
Qty: 18 G | Refills: 0 | Status: SHIPPED | OUTPATIENT
Start: 2024-12-27

## 2024-12-27 RX ORDER — IOPAMIDOL 755 MG/ML
100 INJECTION, SOLUTION INTRAVASCULAR
Status: COMPLETED | OUTPATIENT
Start: 2024-12-27 | End: 2024-12-27

## 2024-12-27 RX ADMIN — IOPAMIDOL 100 ML: 755 INJECTION, SOLUTION INTRAVENOUS at 13:11

## 2024-12-27 RX ADMIN — FUROSEMIDE 40 MG: 10 INJECTION, SOLUTION INTRAMUSCULAR; INTRAVENOUS at 11:20

## 2024-12-27 RX ADMIN — IPRATROPIUM BROMIDE AND ALBUTEROL SULFATE 6 ML: .5; 3 SOLUTION RESPIRATORY (INHALATION) at 11:11

## 2024-12-27 RX ADMIN — METHYLPREDNISOLONE SODIUM SUCCINATE 125 MG: 125 INJECTION, POWDER, FOR SOLUTION INTRAMUSCULAR; INTRAVENOUS at 11:20

## 2024-12-27 NOTE — ED NOTES
Helped pt to the bathroom, pt refused to keep gown on, nurse notified about gown and pt wanting a update

## 2024-12-27 NOTE — DISCHARGE INSTRUCTIONS
Rest.  Drink plenty of fluids.  Stop smoking.  Take your home medications as prescribed.  Follow with your primary care provider in 7 to 10 days.  Return to the ER for increasing shortness of breath, chest pain, fever, increased swelling, or any other concerns issues that may arise.

## 2024-12-27 NOTE — ED PROVIDER NOTES
"Time: 1:58 PM EST  Date of encounter:  12/27/2024  Independent Historian/Clinical History and Information was obtained by:   Patient  Chief Complaint: Cough, feet swelling    History is limited by: N/A    History of Present Illness:  Patient is a 39 y.o. year old male who presents to the emergency department for evaluation of cough and shortness of breath.  Symptoms began about a week ago.  Patient reports his cough is productive of green mucus.  He denies any fevers.  He admits to some mild wheezes.  Patient reports he only has some chest discomfort with coughing.  Patient is he went to urgent care last Sunday and was diagnosed with a \"cold\".    Patient also complains of swelling in his feet and ankles.  Patient reports this has been ongoing for at least 4 months.  Patient states that he has been on Lasix but does not seem to be helping.    HPI    Patient Care Team  Primary Care Provider: Anaya Collins MD    Past Medical History:     Allergies   Allergen Reactions    Coconut Anaphylaxis     Past Medical History:   Diagnosis Date    GERD (gastroesophageal reflux disease)     History of appendectomy     HTN (hypertension)      Past Surgical History:   Procedure Laterality Date    ANKLE SURGERY      APPENDECTOMY       Family History   Problem Relation Age of Onset    Heart failure Mother     Cancer Mother     Hyperlipidemia Father     Hypertension Father     Diabetes Father        Home Medications:  Prior to Admission medications    Medication Sig Start Date End Date Taking? Authorizing Provider   Acetaminophen Extra Strength 500 MG tablet Take 1 tablet by mouth. 5/23/24   Raheel Nowak MD   Aspirin Low Dose 81 MG EC tablet Take 1 tablet by mouth Daily. 9/5/24   Raheel Nowak MD   atenolol (TENORMIN) 25 MG tablet Take 1 tablet by mouth Daily. 9/5/24   Raheel Nowak MD   citalopram (CeleXA) 20 MG tablet Take 1 tablet by mouth Daily. 5/17/24   Raheel Nowak MD   furosemide (LASIX) 40 " MG tablet Take 1 tablet by mouth 2 (Two) Times a Day. 9/12/24   Reynaldo Alvarez APRN   omeprazole (priLOSEC) 20 MG capsule Take 1 capsule by mouth Daily. 5/17/24   Raheel Nowak MD   OXcarbazepine (TRILEPTAL) 150 MG tablet Take 1 tablet by mouth Every 12 (Twelve) Hours. 11/9/24   Raheel Nowak MD   potassium chloride (KLOR-CON M20) 20 MEQ CR tablet Take 1 tablet by mouth Daily. 10/9/24   Raheel Nowak MD   senna 8.6 MG tablet Take 1 tablet by mouth Daily. 11/15/24   Anaya Collins MD   Sublocade 300 MG/1.5ML solution prefilled syringe INJECT THE CONTENTS OF 1 SYRINGE UNDER THE SKIN MONTHLY 11/7/24   Raheel Nowak MD   Ventolin  (90 Base) MCG/ACT inhaler Inhale 2 puffs by mouth every 4 (Four) Hours As Needed for Wheezing or Shortness of Air. 11/15/24   Anaya Collins MD        Social History:   Social History     Tobacco Use    Smoking status: Every Day     Current packs/day: 1.00     Average packs/day: 1 pack/day for 30.0 years (30.0 ttl pk-yrs)     Types: Cigarettes     Start date: 1995    Smokeless tobacco: Never   Vaping Use    Vaping status: Every Day    Substances: Nicotine    Devices: RefBondsyble tank   Substance Use Topics    Alcohol use: Never    Drug use: Not Currently     Types: Opium         Review of Systems:  Review of Systems   Constitutional:  Negative for chills and fever.   HENT:  Negative for congestion, ear pain and sore throat.    Eyes:  Negative for pain.   Respiratory:  Positive for cough and shortness of breath. Negative for chest tightness.    Cardiovascular:  Positive for leg swelling. Negative for chest pain.   Gastrointestinal:  Negative for abdominal pain, diarrhea, nausea and vomiting.   Genitourinary:  Negative for flank pain and hematuria.   Musculoskeletal:  Negative for joint swelling.   Skin:  Negative for pallor.   Neurological:  Negative for seizures and headaches.   All other systems reviewed and are negative.       Physical Exam:  BP  "112/73 (BP Location: Right arm, Patient Position: Sitting)   Pulse 72   Temp 97.6 °F (36.4 °C) (Oral)   Resp 20   Ht 170.2 cm (67\")   Wt 120 kg (264 lb 8.8 oz)   SpO2 92%   BMI 41.43 kg/m²     Physical Exam    Vital signs were reviewed under triage note.  General appearance - Patient appears well-developed and well-nourished.  Patient is in no acute distress.  Head - Normocephalic, atraumatic.  Pupils - Equal, round, reactive to light.  Extraocular muscles are intact.  Conjunctiva is clear.  Nasal - Normal inspection.  No evidence of trauma or epistaxis.  Tympanic membranes - Gray, intact without erythema or retractions.  Oral mucosa - Pink and moist without lesions or erythema.  Uvula is midline.  Chest wall - Atraumatic.  Chest wall is nontender.  There are no vesicular rashes noted.  Neck - Supple.  Trachea was midline.  There is no palpable lymphadenopathy or thyromegaly.  There are no meningeal signs  Lungs - Clear to auscultation and percussion bilaterally.  Heart - Regular rate and rhythm without any murmurs, clicks, or gallops.  Abdomen - Soft.  Bowel sounds are present.  There is no palpable tenderness.  There is no rebound, guarding, or rigidity.  There are no palpable masses.  There are no pulsatile masses.  Back - Spine is straight and midline.  There is no CVA tenderness.  Extremities - Intact x4 with full range of motion.  There is 2-3+ palpable edema involving the lower legs, ankles, and feet.  Pulses are intact x4 and equal.  Neurologic - Patient is awake, alert, and oriented x3.  Cranial nerves II through XII are grossly intact.  Motor and sensory functions grossly intact.  Cerebellar function was normal.  Integument - There are no rashes.  There are no petechia or purpura lesions noted.  There are no vesicular lesions noted.           Procedures:  Procedures      Medical Decision Making:      Comorbidities that affect care:    Hypertension, COPD, GERD, active smoker    External Notes " reviewed:    Previous Clinic Note: Office visit with Dr. Collins on 11/13/2024 was reviewed by me.      The following orders were placed and all results were independently analyzed by me:  Orders Placed This Encounter   Procedures    COVID-19, FLU A/B, RSV PCR 1 HR TAT - Swab, Nasopharynx    XR Chest 1 View    CT Angiogram Chest Pulmonary Embolism    [place] Draw    Comprehensive Metabolic Panel    BNP    High Sensitivity Troponin T    CBC Auto Differential    High Sensitivity Troponin T 1Hr    D-dimer, Quantitative    NPO Diet NPO Type: Strict NPO    Undress & Gown    Continuous Pulse Oximetry    Vital Signs    Oxygen Therapy- Nasal Cannula; Titrate 1-6 LPM Per SpO2; 90 - 95%    ECG 12 Lead ED Triage Standing Order; SOA    Insert Peripheral IV    CBC & Differential    Green Top (Gel)    Lavender Top    Gold Top - SST    Light Blue Top       Medications Given in the Emergency Department:  Medications   sodium chloride 0.9 % flush 10 mL (has no administration in time range)   methylPREDNISolone sodium succinate (SOLU-Medrol) injection 125 mg (125 mg Intravenous Given 12/27/24 1120)   ipratropium-albuterol (DUO-NEB) nebulizer solution 6 mL (6 mL Nebulization Given 12/27/24 1111)   furosemide (LASIX) injection 40 mg (40 mg Intravenous Given 12/27/24 1120)   iopamidol (ISOVUE-370) 76 % injection 100 mL (100 mL Intravenous Given 12/27/24 1311)        ED Course:    ED Course as of 12/27/24 1402   Fri Dec 27, 2024   1359 EKG performed at 8:43 AM was interpreted by me to show a normal sinus rhythm with a ventricular rate of 79 bpm.  The NV interval is 183 ms.  P waves are normal.  QRS interval is normal.  Axis was at 72 degrees.  There was no acute ischemic ST or T wave change identified.  QT corrected was 462 ms. [TB]      ED Course User Index  [TB] August Nelson DO   The patient was seen and evaluated in the ED by me.  The above history and physical examination was performed as documented.  Diagnostic data was obtained.   Results reviewed.  Findings were discussed with the patient.  ED workup ultimately did not show any acute cardiopulmonary processes.  Patient was treated for acute bronchitis.  Patient also requested a refill of his albuterol, Lasix, and PPI.  Patient is stable for discharge home at this time.    Labs:    Lab Results (last 24 hours)       Procedure Component Value Units Date/Time    CBC & Differential [[id number]]  (Abnormal) Collected: 12/27/24 0841    Specimen: Blood Updated: 12/27/24 0848    Narrative:      The following orders were created for panel order CBC & Differential.  Procedure                               Abnormality         Status                     ---------                               -----------         ------                     CBC Auto Differential[[id number]]        Abnormal            Final result                 Please view results for these tests on the individual orders.    Comprehensive Metabolic Panel [[id number]]  (Abnormal) Collected: 12/27/24 0841    Specimen: Blood Updated: 12/27/24 0908     Glucose 110 mg/dL      BUN 15 mg/dL      Creatinine 0.77 mg/dL      Sodium 135 mmol/L      Potassium 3.8 mmol/L      Chloride 97 mmol/L      CO2 24.1 mmol/L      Calcium 9.0 mg/dL      Total Protein 8.1 g/dL      Albumin 4.0 g/dL      ALT (SGPT) 11 U/L      AST (SGOT) 19 U/L      Alkaline Phosphatase 97 U/L      Total Bilirubin 0.2 mg/dL      Globulin 4.1 gm/dL      A/G Ratio 1.0 g/dL      BUN/Creatinine Ratio 19.5     Anion Gap 13.9 mmol/L      eGFR 117.5 mL/min/1.73     Narrative:      GFR Categories in Chronic Kidney Disease (CKD)      GFR Category          GFR (mL/min/1.73)    Interpretation  G1                     90 or greater         Normal or high (1)  G2                      60-89                Mild decrease (1)  G3a                   45-59                Mild to moderate decrease  G3b                   30-44                Moderate to severe decrease  G4                    15-29                 Severe decrease  G5                    14 or less           Kidney failure          (1)In the absence of evidence of kidney disease, neither GFR category G1 or G2 fulfill the criteria for CKD.    eGFR calculation 2021 CKD-EPI creatinine equation, which does not include race as a factor    BNP [[id number]]  (Normal) Collected: 12/27/24 0841    Specimen: Blood Updated: 12/27/24 0906     proBNP 73.5 pg/mL     Narrative:      This assay is used as an aid in the diagnosis of individuals suspected of having heart failure. It can be used as an aid in the diagnosis of acute decompensated heart failure (ADHF) in patients presenting with signs and symptoms of ADHF to the emergency department (ED). In addition, NT-proBNP of <300 pg/mL indicates ADHF is not likely.    Age Range Result Interpretation  NT-proBNP Concentration (pg/mL:      <50             Positive            >450                   Gray                 300-450                    Negative             <300    50-75           Positive            >900                  Gray                300-900                  Negative            <300      >75             Positive            >1800                  Gray                300-1800                  Negative            <300    High Sensitivity Troponin T [[id number]]  (Normal) Collected: 12/27/24 0841    Specimen: Blood Updated: 12/27/24 0908     HS Troponin T <6 ng/L     Narrative:      High Sensitive Troponin T Reference Range:  <14.0 ng/L- Negative Female for AMI  <22.0 ng/L- Negative Male for AMI  >=14 - Abnormal Female indicating possible myocardial injury.  >=22 - Abnormal Male indicating possible myocardial injury.   Clinicians would have to utilize clinical acumen, EKG, Troponin, and serial changes to determine if it is an Acute Myocardial Infarction or myocardial injury due to an underlying chronic condition.         CBC Auto Differential [[id number]]  (Abnormal) Collected: 12/27/24 0841    Specimen: Blood  "Updated: 12/27/24 0848     WBC 8.57 10*3/mm3      RBC 4.76 10*6/mm3      Hemoglobin 13.1 g/dL      Hematocrit 40.3 %      MCV 84.7 fL      MCH 27.5 pg      MCHC 32.5 g/dL      RDW 13.9 %      RDW-SD 42.9 fl      MPV 10.0 fL      Platelets 288 10*3/mm3      Neutrophil % 59.6 %      Lymphocyte % 28.5 %      Monocyte % 7.5 %      Eosinophil % 3.0 %      Basophil % 0.6 %      Immature Grans % 0.8 %      Neutrophils, Absolute 5.11 10*3/mm3      Lymphocytes, Absolute 2.44 10*3/mm3      Monocytes, Absolute 0.64 10*3/mm3      Eosinophils, Absolute 0.26 10*3/mm3      Basophils, Absolute 0.05 10*3/mm3      Immature Grans, Absolute 0.07 10*3/mm3      nRBC 0.0 /100 WBC     D-dimer, Quantitative [[id number]]  (Abnormal) Collected: 12/27/24 0841    Specimen: Blood Updated: 12/27/24 1110     D-Dimer, Quantitative 0.84 MCGFEU/mL     Narrative:      According to the assay 's published package insert, a normal (<0.50 MCGFEU/mL) D-dimer result in conjunction with a non-high clinical probability assessment, excludes deep vein thrombosis (DVT) and pulmonary embolism (PE) with high sensitivity.    D-dimer values increase with age and this can make VTE exclusion of an older population difficult. To address this, the [employer] College [employer], based on best available evidence and recent guidelines, recommends that clinicians use age-adjusted D-dimer thresholds in patients greater than 50 years of age with: a) a low probability of PE who do not meet all Pulmonary Embolism Rule Out Criteria, or b) in those with intermediate probability of PE.   The formula for an age-adjusted D-dimer cut-off is \"age/100\".  For example, a 60 year old patient would have an age-adjusted cut-off of 0.60 MCGFEU/mL and an 80 year old 0.80 MCGFEU/mL.    High Sensitivity Troponin T 1Hr [[id number]] Collected: 12/27/24 1127    Specimen: Blood Updated: 12/27/24 1150     HS Troponin T 7 ng/L      Troponin T Numeric Delta --     Comment: Unable to " calculate.       Narrative:      High Sensitive Troponin T Reference Range:  <14.0 ng/L- Negative Female for AMI  <22.0 ng/L- Negative Male for AMI  >=14 - Abnormal Female indicating possible myocardial injury.  >=22 - Abnormal Male indicating possible myocardial injury.   Clinicians would have to utilize clinical acumen, EKG, Troponin, and serial changes to determine if it is an Acute Myocardial Infarction or myocardial injury due to an underlying chronic condition.         COVID-19, FLU A/B, RSV PCR 1 HR TAT - Swab, Nasopharynx [[id number]]  (Normal) Collected: 12/27/24 1127    Specimen: Swab from Nasopharynx Updated: 12/27/24 1212     COVID19 Not Detected     Influenza A PCR Not Detected     Influenza B PCR Not Detected     RSV, PCR Not Detected    Narrative:      Fact sheet for providers: https://www.fda.gov/media/065757/download    Fact sheet for patients: https://www.fda.gov/media/463638/download    Test performed by PCR.             Imaging:    CT Angiogram Chest Pulmonary Embolism    Result Date: 12/27/2024  CT ANGIOGRAM CHEST PULMONARY EMBOLISM Date of Exam: 12/27/2024 1:09 PM EST Indication: Cough, shortness of breath?positive D-dimer. Comparison: 8/14/2024 Technique: Axial CT images were obtained of the chest after the uneventful intravenous administration of iodinated contrast utilizing pulmonary embolism protocol.  Reconstructed coronal and sagittal images were also obtained. Automated exposure control and iterative construction methods were used. Findings: No central pulmonary emboli are identified. The peripheral branches are obscured by motion artifact. The thoracic aorta has a normal caliber. There is mild cardiomegaly. No evidence of pleural or pericardial effusion. Images of the upper abdomen are unremarkable. Mild degenerative changes are present in the thoracic spine. There is mild scarring in the right middle lobe and lingula. There is no mediastinal, axillary or hilar adenopathy.      Impression: No acute findings. No central pulmonary emboli are identified. The peripheral branches are obscured by motion artifact. Electronically Signed: Michael Ovalle MD  12/27/2024 1:27 PM EST  Workstation ID: [id number]    XR Chest 1 View    Result Date: 12/27/2024  XR CHEST 1 VW Date of Exam: 12/27/2024 9:02 AM EST Indication: SOA Triage Protocol Comparison: 8/14/2024 Findings: Heart size is at the upper limits of normal. There is central pulmonary vascular congestion. No focal pulmonary consolidations. No pneumothorax identified. No large effusion.     Impression: Borderline cardiomegaly with central pulmonary vascular congestion. Electronically Signed: Chico Aguilera MD  12/27/2024 9:23 AM EST  Workstation ID: [id number]       Differential Diagnosis and Discussion:    Cough: Differential diagnosis includes but is not limited to pneumonia, acute bronchitis, upper respiratory infection, ACE inhibitor use, allergic reaction, epiglottitis, seasonal allergies, chemical irritants, exercise-induced asthma, viral syndrome.  Edema: Based on the patient's history, signs, and symptoms, the differential diagnosis includes but is not limited to heart failure, kidney disease, liver disease, venous insufficiency, lymphedema, pregnancy, medications, allergic reactions.    Labs were collected in the emergency department and all labs were reviewed and interpreted by me.  X-ray were performed in the emergency department and all X-ray impressions were independently interpreted by me.  An EKG was performed and the EKG was interpreted by me.  CT scan was performed in the emergency department and the CT scan radiology impression was interpreted by me.    MDM           Patient Care Considerations:    SEPSIS was considered but is NOT present in the emergency department as SIRS criteria is not present.      Consultants/Shared Management Plan:    None    Social Determinants of Health:    Patient is independent, reliable, and has access  to care.       Disposition and Care Coordination:    Discharged: I considered escalation of care by admitting this patient to the hospital, however acute findings to warrant inpatient admission.    I have explained the patient´s condition, diagnoses and treatment plan based on the information available to me at this time. I have answered questions and addressed any concerns. The patient has a good  understanding of the patient´s diagnosis, condition, and treatment plan as can be expected at this point. The vital signs have been stable. The patient´s condition is stable and appropriate for discharge from the emergency department.      The patient will pursue further outpatient evaluation with the primary care physician or other designated or consulting physician as outlined in the discharge instructions. They are agreeable to this plan of care and follow-up instructions have been explained in detail. The patient has received these instructions in written format and has expressed an understanding of the discharge instructions. The patient is aware that any significant change in condition or worsening of symptoms should prompt an immediate return to this or the closest emergency department or call to 911.  I have explained discharge medications and the need for follow up with the patient/caretakers. This was also printed in the discharge instructions. Patient was discharged with the following medications and follow up:      Medication List        New Prescriptions      azithromycin 250 MG tablet  Commonly known as: Zithromax Z-Parker  Take 2 tablets by mouth on day 1, then 1 tablet daily on days 2-5     pantoprazole 40 MG EC tablet  Commonly known as: PROTONIX  Take 1 tablet by mouth Daily.            Changed      furosemide 80 MG tablet  Commonly known as: LASIX  Take 1 tablet by mouth 2 (Two) Times a Day.  What changed:   medication strength  how much to take            Stop      omeprazole 20 MG capsule  Commonly known  as: priLOSEC               Where to Get Your Medications        These medications were sent to [facility] - [place], [place] - [address] - [phone]  - [phone]   [address] [place] [address]      Phone: [phone]   azithromycin 250 MG tablet  furosemide 80 MG tablet  pantoprazole 40 MG EC tablet  Ventolin  (90 Base) MCG/ACT inhaler      Anaya Collins MD  [address] [place] [address]  [phone]    In 1 week        Final diagnoses:   Acute bronchitis, unspecified organism   Pedal edema   Medication refill        ED Disposition       ED Disposition   Discharge    Condition   Stable    Comment   --               This medical record created using voice recognition software.             August Nelson DO  12/30/24 1134

## 2024-12-27 NOTE — Clinical Note
[facility] EMERGENCY ROOM  [address] [place] [address]  Phone: [phone]  Fax: [phone]    Agustin Frankel was seen and treated in our emergency department on 12/27/2024.  He may return to work on 12/30/2024.         Thank you for choosing [facility].    August Nelson, DO

## 2025-01-07 LAB
QT INTERVAL: 403 MS
QTC INTERVAL: 462 MS